# Patient Record
Sex: FEMALE | Race: WHITE | NOT HISPANIC OR LATINO | URBAN - METROPOLITAN AREA
[De-identification: names, ages, dates, MRNs, and addresses within clinical notes are randomized per-mention and may not be internally consistent; named-entity substitution may affect disease eponyms.]

---

## 2017-07-06 ENCOUNTER — OUTPATIENT (OUTPATIENT)
Dept: OUTPATIENT SERVICES | Facility: HOSPITAL | Age: 57
LOS: 1 days | End: 2017-07-06
Payer: OTHER MISCELLANEOUS

## 2017-07-06 VITALS
SYSTOLIC BLOOD PRESSURE: 122 MMHG | DIASTOLIC BLOOD PRESSURE: 78 MMHG | HEART RATE: 80 BPM | RESPIRATION RATE: 16 BRPM | TEMPERATURE: 99 F | WEIGHT: 184.09 LBS | HEIGHT: 68 IN

## 2017-07-06 DIAGNOSIS — M75.42 IMPINGEMENT SYNDROME OF LEFT SHOULDER: ICD-10-CM

## 2017-07-06 DIAGNOSIS — Z98.51 TUBAL LIGATION STATUS: Chronic | ICD-10-CM

## 2017-07-06 DIAGNOSIS — Z98.89 OTHER SPECIFIED POSTPROCEDURAL STATES: Chronic | ICD-10-CM

## 2017-07-06 DIAGNOSIS — Z98.890 OTHER SPECIFIED POSTPROCEDURAL STATES: Chronic | ICD-10-CM

## 2017-07-06 DIAGNOSIS — M25.512 PAIN IN LEFT SHOULDER: ICD-10-CM

## 2017-07-06 LAB
BASOPHILS # BLD AUTO: 0.03 K/UL — SIGNIFICANT CHANGE UP (ref 0–0.2)
BASOPHILS NFR BLD AUTO: 0.5 % — SIGNIFICANT CHANGE UP (ref 0–2)
BUN SERPL-MCNC: 14 MG/DL — SIGNIFICANT CHANGE UP (ref 7–23)
CALCIUM SERPL-MCNC: 9.7 MG/DL — SIGNIFICANT CHANGE UP (ref 8.4–10.5)
CHLORIDE SERPL-SCNC: 103 MMOL/L — SIGNIFICANT CHANGE UP (ref 98–107)
CO2 SERPL-SCNC: 25 MMOL/L — SIGNIFICANT CHANGE UP (ref 22–31)
CREAT SERPL-MCNC: 0.89 MG/DL — SIGNIFICANT CHANGE UP (ref 0.5–1.3)
EOSINOPHIL # BLD AUTO: 0.07 K/UL — SIGNIFICANT CHANGE UP (ref 0–0.5)
EOSINOPHIL NFR BLD AUTO: 1.2 % — SIGNIFICANT CHANGE UP (ref 0–6)
GLUCOSE SERPL-MCNC: 57 MG/DL — LOW (ref 70–99)
HCT VFR BLD CALC: 45.5 % — HIGH (ref 34.5–45)
HGB BLD-MCNC: 14.8 G/DL — SIGNIFICANT CHANGE UP (ref 11.5–15.5)
IMM GRANULOCYTES # BLD AUTO: 0.01 # — SIGNIFICANT CHANGE UP
IMM GRANULOCYTES NFR BLD AUTO: 0.2 % — SIGNIFICANT CHANGE UP (ref 0–1.5)
LYMPHOCYTES # BLD AUTO: 2.4 K/UL — SIGNIFICANT CHANGE UP (ref 1–3.3)
LYMPHOCYTES # BLD AUTO: 41 % — SIGNIFICANT CHANGE UP (ref 13–44)
MCHC RBC-ENTMCNC: 32.2 PG — SIGNIFICANT CHANGE UP (ref 27–34)
MCHC RBC-ENTMCNC: 32.5 % — SIGNIFICANT CHANGE UP (ref 32–36)
MCV RBC AUTO: 99.1 FL — SIGNIFICANT CHANGE UP (ref 80–100)
MONOCYTES # BLD AUTO: 0.44 K/UL — SIGNIFICANT CHANGE UP (ref 0–0.9)
MONOCYTES NFR BLD AUTO: 7.5 % — SIGNIFICANT CHANGE UP (ref 2–14)
NEUTROPHILS # BLD AUTO: 2.9 K/UL — SIGNIFICANT CHANGE UP (ref 1.8–7.4)
NEUTROPHILS NFR BLD AUTO: 49.6 % — SIGNIFICANT CHANGE UP (ref 43–77)
NRBC # FLD: 0 — SIGNIFICANT CHANGE UP
PLATELET # BLD AUTO: 273 K/UL — SIGNIFICANT CHANGE UP (ref 150–400)
PMV BLD: 10.9 FL — SIGNIFICANT CHANGE UP (ref 7–13)
POTASSIUM SERPL-MCNC: 3.9 MMOL/L — SIGNIFICANT CHANGE UP (ref 3.5–5.3)
POTASSIUM SERPL-SCNC: 3.9 MMOL/L — SIGNIFICANT CHANGE UP (ref 3.5–5.3)
RBC # BLD: 4.59 M/UL — SIGNIFICANT CHANGE UP (ref 3.8–5.2)
RBC # FLD: 13.2 % — SIGNIFICANT CHANGE UP (ref 10.3–14.5)
SODIUM SERPL-SCNC: 144 MMOL/L — SIGNIFICANT CHANGE UP (ref 135–145)
WBC # BLD: 5.85 K/UL — SIGNIFICANT CHANGE UP (ref 3.8–10.5)
WBC # FLD AUTO: 5.85 K/UL — SIGNIFICANT CHANGE UP (ref 3.8–10.5)

## 2017-07-06 PROCEDURE — 93010 ELECTROCARDIOGRAM REPORT: CPT

## 2017-07-06 NOTE — H&P PST ADULT - FAMILY HISTORY
Father  Still living? No  Family history of CHF (congestive heart failure), Age at diagnosis: Age Unknown     Sibling  Still living? Yes, Estimated age: 74  Hypertension, Age at diagnosis: Age Unknown     Sibling  Still living? Yes, Estimated age: 69  Hypertension, Age at diagnosis: Age Unknown     Sibling  Still living? Yes, Estimated age: 64  Hypertension, Age at diagnosis: Age Unknown     Sibling  Still living? Yes, Estimated age: 62  Hypertension, Age at diagnosis: Age Unknown     Sibling  Still living? Yes, Estimated age: 59  Hypertension, Age at diagnosis: Age Unknown     Sibling  Still living? No  Family history of heart attack, Age at diagnosis: Age Unknown

## 2017-07-06 NOTE — H&P PST ADULT - REASON FOR ADMISSION
"he's going to shave 2 spots of the bone here, my left shoulder and I think he's going to release a tendon in the back to help my arm move more freely"

## 2017-07-06 NOTE — H&P PST ADULT - NSANTHOSAYNRD_GEN_A_CORE
No. ESTELLE screening performed.  STOP BANG Legend: 0-2 = LOW Risk; 3-4 = INTERMEDIATE Risk; 5-8 = HIGH Risk

## 2017-07-06 NOTE — H&P PST ADULT - PROBLEM SELECTOR PLAN 1
Pt. is scheduled for a left shoulder arthroscopy posterior capsular release, subacromial decompression distal clavicle resection 7/24/17.

## 2017-07-06 NOTE — H&P PST ADULT - PMH
Breast cyst, right    Rotator cuff (capsule) sprain  Right shoulder Breast cyst, right    Estrogen deficiency

## 2017-07-06 NOTE — H&P PST ADULT - PSH
H/O arthroscopy of right knee    History of arthroscopy of right knee  Rotator Cuff Repair - 2013  S/P tonsillectomy    Tubal ligation status  1996 H/O arthroscopy of shoulder  right x2-2014, 2015  H/O tubal ligation  1996  History of open reduction and internal fixation (ORIF) procedure  right knee-1980  S/P tonsillectomy

## 2017-07-06 NOTE — H&P PST ADULT - ATTENDING COMMENTS
To the best of my ability as an orthopedic surgeon, I assert this.  As for the H & P, there are no changes orthopaedically.  Otherwise as per anesthesia and/or medicine

## 2017-09-18 ENCOUNTER — OUTPATIENT (OUTPATIENT)
Dept: OUTPATIENT SERVICES | Facility: HOSPITAL | Age: 57
LOS: 1 days | End: 2017-09-18

## 2017-09-18 VITALS
HEART RATE: 67 BPM | SYSTOLIC BLOOD PRESSURE: 114 MMHG | TEMPERATURE: 98 F | WEIGHT: 184.97 LBS | OXYGEN SATURATION: 98 % | HEIGHT: 68 IN | RESPIRATION RATE: 16 BRPM | DIASTOLIC BLOOD PRESSURE: 80 MMHG

## 2017-09-18 DIAGNOSIS — M75.42 IMPINGEMENT SYNDROME OF LEFT SHOULDER: ICD-10-CM

## 2017-09-18 DIAGNOSIS — M75.02 ADHESIVE CAPSULITIS OF LEFT SHOULDER: ICD-10-CM

## 2017-09-18 DIAGNOSIS — Z98.89 OTHER SPECIFIED POSTPROCEDURAL STATES: Chronic | ICD-10-CM

## 2017-09-18 DIAGNOSIS — Z98.890 OTHER SPECIFIED POSTPROCEDURAL STATES: Chronic | ICD-10-CM

## 2017-09-18 DIAGNOSIS — Z98.51 TUBAL LIGATION STATUS: Chronic | ICD-10-CM

## 2017-09-18 LAB
HCT VFR BLD CALC: 43.8 % — SIGNIFICANT CHANGE UP (ref 34.5–45)
HGB BLD-MCNC: 14.6 G/DL — SIGNIFICANT CHANGE UP (ref 11.5–15.5)
MCHC RBC-ENTMCNC: 32.5 PG — SIGNIFICANT CHANGE UP (ref 27–34)
MCHC RBC-ENTMCNC: 33.3 % — SIGNIFICANT CHANGE UP (ref 32–36)
MCV RBC AUTO: 97.6 FL — SIGNIFICANT CHANGE UP (ref 80–100)
NRBC # FLD: 0 — SIGNIFICANT CHANGE UP
PLATELET # BLD AUTO: 266 K/UL — SIGNIFICANT CHANGE UP (ref 150–400)
PMV BLD: 10.7 FL — SIGNIFICANT CHANGE UP (ref 7–13)
RBC # BLD: 4.49 M/UL — SIGNIFICANT CHANGE UP (ref 3.8–5.2)
RBC # FLD: 13.5 % — SIGNIFICANT CHANGE UP (ref 10.3–14.5)
WBC # BLD: 5.73 K/UL — SIGNIFICANT CHANGE UP (ref 3.8–10.5)
WBC # FLD AUTO: 5.73 K/UL — SIGNIFICANT CHANGE UP (ref 3.8–10.5)

## 2017-09-18 NOTE — H&P PST ADULT - HISTORY OF PRESENT ILLNESS
57 yr old female with no significant medical hx presents for preop evaluation with c/o left shoulder pain x 1 yr after lifting heavy box.  Pt s/p MRI and was dx with Adhesive capsulitis of left shoulder and is now scheduled for Left Shoulder Arthroscopy 57 yr old female with no significant medical hx presents for preop evaluation with c/o left shoulder pain x 1 yr after lifting heavy box.  Pt s/p MRI and was dx with Adhesive capsulitis of left shoulder and is now scheduled for Left Shoulder Arthroscopy, Posterior Capsular Release, Subacromial Decompression Distal Clavicle Resection on 09/25/17.

## 2017-09-18 NOTE — H&P PST ADULT - PSH
H/O arthroscopy of shoulder  right x2-2014, 2015  H/O tubal ligation  1996  History of open reduction and internal fixation (ORIF) procedure  right knee-1980  S/P tonsillectomy

## 2017-09-18 NOTE — H&P PST ADULT - PROBLEM SELECTOR PLAN 1
Scheduled for Left Shoulder Arthroscopy, Posterior Capsular Release, Subacromial Decompression Distal Resection on 09/25/17.  Lab result pending.  Preop, famotidine and chlorhexidine instructions provided and questions addressed.

## 2017-09-18 NOTE — H&P PST ADULT - RS GEN PE MLT RESP DETAILS PC
clear to auscultation bilaterally/respirations non-labored/no rales/breath sounds equal/no rhonchi/no wheezes

## 2017-09-18 NOTE — H&P PST ADULT - GASTROINTESTINAL DETAILS
soft/nontender/no distention/no rebound tenderness/no rigidity/no guarding/no organomegaly/no bruit/no masses palpable/bowel sounds normal

## 2017-09-18 NOTE — H&P PST ADULT - LYMPHATIC
supraclavicular L/posterior cervical L/anterior cervical L/supraclavicular R/posterior cervical R/anterior cervical R

## 2017-09-18 NOTE — H&P PST ADULT - ATTENDING COMMENTS
family/patient To the best of my ability as an orthopedic surgeon, I assert this.  As for the H & P, there are no changes orthopaedically.  Otherwise as per anesthesia and/or medicine.. To the best of my ability as an orthopedic surgeon, I assert this.  As for the H & P, there are no changes orthopaedically.  Otherwise as per anesthesia and/or medicine.

## 2017-09-25 ENCOUNTER — OUTPATIENT (OUTPATIENT)
Dept: OUTPATIENT SERVICES | Facility: HOSPITAL | Age: 57
LOS: 1 days | Discharge: HOME | End: 2017-09-25

## 2017-09-25 VITALS
OXYGEN SATURATION: 98 % | SYSTOLIC BLOOD PRESSURE: 95 MMHG | DIASTOLIC BLOOD PRESSURE: 70 MMHG | HEART RATE: 67 BPM | TEMPERATURE: 98 F | RESPIRATION RATE: 14 BRPM

## 2017-09-25 VITALS
OXYGEN SATURATION: 97 % | SYSTOLIC BLOOD PRESSURE: 104 MMHG | RESPIRATION RATE: 16 BRPM | DIASTOLIC BLOOD PRESSURE: 68 MMHG | HEIGHT: 68 IN | WEIGHT: 184.97 LBS | HEART RATE: 61 BPM | TEMPERATURE: 98 F

## 2017-09-25 DIAGNOSIS — Z98.890 OTHER SPECIFIED POSTPROCEDURAL STATES: Chronic | ICD-10-CM

## 2017-09-25 DIAGNOSIS — M75.42 IMPINGEMENT SYNDROME OF LEFT SHOULDER: ICD-10-CM

## 2017-09-25 DIAGNOSIS — Z98.89 OTHER SPECIFIED POSTPROCEDURAL STATES: Chronic | ICD-10-CM

## 2017-09-25 DIAGNOSIS — Z98.51 TUBAL LIGATION STATUS: Chronic | ICD-10-CM

## 2017-09-25 NOTE — BRIEF OPERATIVE NOTE - PROCEDURE
<<-----Click on this checkbox to enter Procedure Shoulder arthroscopy with Barbara procedure  09/25/2017  posterior capsular release  Active  ALMAL

## 2017-09-25 NOTE — ASU DISCHARGE PLAN (ADULT/PEDIATRIC). - SPECIAL INSTRUCTIONS
Start motion exercises in 1 day with pendulum motions. See instruction sheet for further instruction. Start motion exercises in 1 day with pendulum motions. See instruction sheet for further instruction.    Use incentive spirometer as instructed.   Start physical therapy in 1-2 days.

## 2017-09-25 NOTE — ASU DISCHARGE PLAN (ADULT/PEDIATRIC). - NURSING INSTRUCTIONS
Narcotic pain medication may cause nausea or constipation. Take medication with food. Increase fluids and fiber intake.   Use Cryocuff as directed.  Take pain medications as needed. No Toradol before 4 PM today.

## 2017-09-25 NOTE — ASU DISCHARGE PLAN (ADULT/PEDIATRIC). - ITEMS TO FOLLOWUP WITH YOUR PHYSICIAN'S
If you develop fever, chills, shakes or incision develops discharge call [phone] (24 hours a day) or go to ER.

## 2017-09-25 NOTE — ASU DISCHARGE PLAN (ADULT/PEDIATRIC). - NOTIFY
Persistent Nausea and Vomiting/Pain not relieved by Medications/Fever greater than 101/Swelling that continues/Bleeding that does not stop/Numbness, color, or temperature change to extremity/Unable to Urinate/Inability to Tolerate Liquids or Foods

## 2017-09-25 NOTE — ASU DISCHARGE PLAN (ADULT/PEDIATRIC). - ACTIVITY LEVEL
no sports/gym/Do not lift, push, pull, or carry anything with left arm/hand. Do not lift or actively move elbow away from side of body./no exercise/no heavy lifting

## 2019-07-31 PROBLEM — M75.02 ADHESIVE CAPSULITIS OF LEFT SHOULDER: Chronic | Status: ACTIVE | Noted: 2017-09-18

## 2019-07-31 PROBLEM — E28.39 OTHER PRIMARY OVARIAN FAILURE: Chronic | Status: ACTIVE | Noted: 2017-07-06

## 2019-08-27 ENCOUNTER — OUTPATIENT (OUTPATIENT)
Dept: OUTPATIENT SERVICES | Facility: HOSPITAL | Age: 59
LOS: 1 days | End: 2019-08-27

## 2019-08-27 VITALS
RESPIRATION RATE: 18 BRPM | TEMPERATURE: 98 F | DIASTOLIC BLOOD PRESSURE: 80 MMHG | OXYGEN SATURATION: 98 % | HEIGHT: 68 IN | WEIGHT: 154.1 LBS | HEART RATE: 76 BPM | SYSTOLIC BLOOD PRESSURE: 100 MMHG

## 2019-08-27 DIAGNOSIS — Z98.890 OTHER SPECIFIED POSTPROCEDURAL STATES: Chronic | ICD-10-CM

## 2019-08-27 DIAGNOSIS — Z98.51 TUBAL LIGATION STATUS: Chronic | ICD-10-CM

## 2019-08-27 DIAGNOSIS — Z98.89 OTHER SPECIFIED POSTPROCEDURAL STATES: Chronic | ICD-10-CM

## 2019-08-27 DIAGNOSIS — M25.512 PAIN IN LEFT SHOULDER: ICD-10-CM

## 2019-08-27 LAB
ANION GAP SERPL CALC-SCNC: 13 MMO/L — SIGNIFICANT CHANGE UP (ref 7–14)
BUN SERPL-MCNC: 13 MG/DL — SIGNIFICANT CHANGE UP (ref 7–23)
CALCIUM SERPL-MCNC: 9.6 MG/DL — SIGNIFICANT CHANGE UP (ref 8.4–10.5)
CHLORIDE SERPL-SCNC: 103 MMOL/L — SIGNIFICANT CHANGE UP (ref 98–107)
CO2 SERPL-SCNC: 24 MMOL/L — SIGNIFICANT CHANGE UP (ref 22–31)
CREAT SERPL-MCNC: 0.78 MG/DL — SIGNIFICANT CHANGE UP (ref 0.5–1.3)
GLUCOSE SERPL-MCNC: 75 MG/DL — SIGNIFICANT CHANGE UP (ref 70–99)
HCT VFR BLD CALC: 39.5 % — SIGNIFICANT CHANGE UP (ref 34.5–45)
HGB BLD-MCNC: 13.1 G/DL — SIGNIFICANT CHANGE UP (ref 11.5–15.5)
MCHC RBC-ENTMCNC: 32.8 PG — SIGNIFICANT CHANGE UP (ref 27–34)
MCHC RBC-ENTMCNC: 33.2 % — SIGNIFICANT CHANGE UP (ref 32–36)
MCV RBC AUTO: 99 FL — SIGNIFICANT CHANGE UP (ref 80–100)
NRBC # FLD: 0 K/UL — SIGNIFICANT CHANGE UP (ref 0–0)
PLATELET # BLD AUTO: 273 K/UL — SIGNIFICANT CHANGE UP (ref 150–400)
PMV BLD: 11.4 FL — SIGNIFICANT CHANGE UP (ref 7–13)
POTASSIUM SERPL-MCNC: 4 MMOL/L — SIGNIFICANT CHANGE UP (ref 3.5–5.3)
POTASSIUM SERPL-SCNC: 4 MMOL/L — SIGNIFICANT CHANGE UP (ref 3.5–5.3)
RBC # BLD: 3.99 M/UL — SIGNIFICANT CHANGE UP (ref 3.8–5.2)
RBC # FLD: 13.2 % — SIGNIFICANT CHANGE UP (ref 10.3–14.5)
SODIUM SERPL-SCNC: 140 MMOL/L — SIGNIFICANT CHANGE UP (ref 135–145)
WBC # BLD: 6.3 K/UL — SIGNIFICANT CHANGE UP (ref 3.8–10.5)
WBC # FLD AUTO: 6.3 K/UL — SIGNIFICANT CHANGE UP (ref 3.8–10.5)

## 2019-08-27 RX ORDER — NORETHINDRONE AND ETHINYL ESTRADIOL 0.4-0.035
1 KIT ORAL
Qty: 0 | Refills: 0 | DISCHARGE

## 2019-08-27 NOTE — H&P PST ADULT - NSICDXFAMILYHX_GEN_ALL_CORE_FT
FAMILY HISTORY:  FH: diabetes mellitus, mother    Father  Still living? No  Family history of CHF (congestive heart failure), Age at diagnosis: Age Unknown    Sibling  Still living? Yes, Estimated age: 74  Hypertension, Age at diagnosis: Age Unknown  Hypertension, Age at diagnosis: Age Unknown  Hypertension, Age at diagnosis: Age Unknown  Hypertension, Age at diagnosis: Age Unknown  Hypertension, Age at diagnosis: Age Unknown  Family history of heart attack, Age at diagnosis: Age Unknown

## 2019-08-27 NOTE — H&P PST ADULT - NEGATIVE GENERAL GENITOURINARY SYMPTOMS
no incontinence/no flank pain L/no hematuria/no renal colic/no flank pain R/no dysuria/no bladder infections

## 2019-08-27 NOTE — H&P PST ADULT - NSICDXPASTMEDICALHX_GEN_ALL_CORE_FT
PAST MEDICAL HISTORY:  Adhesive capsulitis of left shoulder     Breast cyst, right     Estrogen deficiency

## 2019-08-27 NOTE — H&P PST ADULT - NEGATIVE ENMT SYMPTOMS
no hearing difficulty/no nasal congestion/no post-nasal discharge/no sinus symptoms/no dysphagia/no throat pain

## 2019-08-27 NOTE — H&P PST ADULT - NSICDXPASTSURGICALHX_GEN_ALL_CORE_FT
PAST SURGICAL HISTORY:  H/O arthroscopy left shoulder 09/2017    H/O arthroscopy of shoulder right x2-2014, 2015    H/O tubal ligation 1996    History of open reduction and internal fixation (ORIF) procedure right knee-1980    S/P lumpectomy of breast benign, left breast    S/P tonsillectomy

## 2019-08-27 NOTE — H&P PST ADULT - HISTORY OF PRESENT ILLNESS
59 y.o. female with preop diagnosis of pain in left shoulder, adhesive capsulitis, reports hx of left shoulder surgery in 09/2017, c/o intermittent pain in the left shoulder, aggravated by movement and activity, and restricted ROM in left shoulder, presents to PST for evaluation for Left Shoulder Arthroscopy, Debridement, Open Distal Clavicle Resection on 09/09/19 59 y.o. female with hx of left shoulder surgery in 09/2017, c/o intermittent pain in the left shoulder, reports pain is worse at night, c/o restricted ROM in left shoulder, preop diagnosis of adhesive capsulitis left shoulder, reports hx of left shoulder surgery in 09/2017, presents to PST for evaluation for Left Shoulder Arthroscopy, Debridement, Open Distal Clavicle Resection on 09/09/19

## 2019-08-27 NOTE — H&P PST ADULT - NSICDXPROBLEM_GEN_ALL_CORE_FT
PROBLEM DIAGNOSES  Problem: Pain in left shoulder  Assessment and Plan: pt scheduled for   Preop instructions provided. Pt verbalized understanding.   Pepcid for GI prophylaxis with written and verbal instruction provided    written and verbal instructions with teach back on chlorhexidine shampoo provided,  pt verbalized understanding with returned demonstration   med eval pending as per surgeon request, copy requested PROBLEM DIAGNOSES  Problem: Pain in left shoulder  Assessment and Plan: pt scheduled for Left Shoulder Arthroscopy, Debridement, Open Distal Clavicle Resection on 09/09/19   Preop instructions provided. Pt verbalized understanding.   Pepcid for GI prophylaxis with written and verbal instruction provided    written and verbal instructions with teach back on chlorhexidine shampoo provided,  pt verbalized understanding with returned demonstration   med eval pending as per surgeon request, copy requested

## 2019-08-27 NOTE — H&P PST ADULT - ACTIVITY
walking, aerobics, daily, utilizes stairs in the home "at least a half dozen times a day", denies SOB

## 2019-08-27 NOTE — H&P PST ADULT - MUSCULOSKELETAL
Cesar 44  Brief Op Note     Lavell Pepe Location: OR   [facility] [id number] MRN [id number]   Admission Date 3/13/2017 Operation Date 3/13/2017   Attending Physician Rocio Diego MD Operating Physician Phylicia Barreto MD details… detailed exam left shoulder/normal strength/decreased ROM due to pain/no calf tenderness

## 2019-08-27 NOTE — H&P PST ADULT - NEGATIVE MUSCULOSKELETAL SYMPTOMS
no joint swelling/no muscle weakness/no arthralgia/no arthritis/no stiffness/no myalgia/no back pain/no neck pain

## 2019-09-08 ENCOUNTER — TRANSCRIPTION ENCOUNTER (OUTPATIENT)
Age: 59
End: 2019-09-08

## 2019-09-09 ENCOUNTER — RESULT REVIEW (OUTPATIENT)
Age: 59
End: 2019-09-09

## 2019-09-09 ENCOUNTER — OUTPATIENT (OUTPATIENT)
Dept: OUTPATIENT SERVICES | Facility: HOSPITAL | Age: 59
LOS: 1 days | Discharge: HOME | End: 2019-09-09
Payer: OTHER MISCELLANEOUS

## 2019-09-09 VITALS
OXYGEN SATURATION: 99 % | HEIGHT: 68 IN | RESPIRATION RATE: 16 BRPM | SYSTOLIC BLOOD PRESSURE: 109 MMHG | WEIGHT: 154.1 LBS | DIASTOLIC BLOOD PRESSURE: 57 MMHG | HEART RATE: 60 BPM | TEMPERATURE: 98 F

## 2019-09-09 VITALS
DIASTOLIC BLOOD PRESSURE: 75 MMHG | HEART RATE: 72 BPM | OXYGEN SATURATION: 97 % | RESPIRATION RATE: 18 BRPM | SYSTOLIC BLOOD PRESSURE: 114 MMHG | TEMPERATURE: 98 F

## 2019-09-09 DIAGNOSIS — M25.512 PAIN IN LEFT SHOULDER: ICD-10-CM

## 2019-09-09 DIAGNOSIS — Z98.89 OTHER SPECIFIED POSTPROCEDURAL STATES: Chronic | ICD-10-CM

## 2019-09-09 DIAGNOSIS — Z98.51 TUBAL LIGATION STATUS: Chronic | ICD-10-CM

## 2019-09-09 DIAGNOSIS — Z98.890 OTHER SPECIFIED POSTPROCEDURAL STATES: Chronic | ICD-10-CM

## 2019-09-09 PROCEDURE — 88311 DECALCIFY TISSUE: CPT | Mod: 26

## 2019-09-09 PROCEDURE — 88304 TISSUE EXAM BY PATHOLOGIST: CPT | Mod: 26

## 2019-09-09 NOTE — ASU DISCHARGE PLAN (ADULT/PEDIATRIC) - CALL YOUR DOCTOR IF YOU HAVE ANY OF THE FOLLOWING:
Wound/Surgical Site with redness, or foul smelling discharge or pus/Numbness, tingling, color or temperature change to extremity/Fever greater than (need to indicate Fahrenheit or Celsius)/Pain not relieved by Medications Numbness, tingling, color or temperature change to extremity/Nausea and vomiting that does not stop/Inability to tolerate liquids or foods/Swelling that gets worse/Bleeding that does not stop/Pain not relieved by Medications/Fever greater than (need to indicate Fahrenheit or Celsius)/Wound/Surgical Site with redness, or foul smelling discharge or pus

## 2019-09-09 NOTE — ASU DISCHARGE PLAN (ADULT/PEDIATRIC) - CARE PROVIDER_API CALL
Alfred Guillaume)  Orthopaedic Surgery  [facility] [place] [address]  Phone: [phone]  Fax: [phone]  Follow Up Time:

## 2019-09-09 NOTE — ASU DISCHARGE PLAN (ADULT/PEDIATRIC) - PAIN MANAGEMENT
Prescriptions electronically submitted to pharmacy from doctor's office Prescription given to patient/guardian/Prescriptions electronically submitted to pharmacy from doctor's office

## 2019-09-09 NOTE — BRIEF OPERATIVE NOTE - NSICDXBRIEFPROCEDURE_GEN_ALL_CORE_FT
PROCEDURES:  Excision, clavicle, distal, open 09-Sep-2019 09:25:32 left Yuliya Nieves  Arthroscopy of left shoulder with debridement and surgical removal of distal clavicle 09-Sep-2019 09:25:16  Yuliya Nieves

## 2019-09-09 NOTE — ASU DISCHARGE PLAN (ADULT/PEDIATRIC) - ASU DC SPECIAL INSTRUCTIONSFT
Narcotic pain medicine can cause extreme nausea and constipation. Drink plenty of water and take diuretics (colace, Miralax) as needed. You can get them from your local pharmacy.    It is important to ice your shoulder to keep swelling down and the pain manageable. Keep the ice on for 20 minutes, and then keep off for 20 minutes. Repeat while awake.    Take the pain medication from Dr. Sutton office as prescribed Narcotic pain medicine can cause extreme nausea and constipation. Drink plenty of water and take diuretics (colace, Miralax) as needed. You can get them from your local pharmacy.    It is important to ice your shoulder to keep swelling down and the pain manageable. Keep the ice on for 20 minutes, and then keep off for 20 minutes. Repeat while awake.    Take the pain medication from Dr. Sutton office as prescribed  Refer to Dr. Guillaume instruction     Physical therapy RX given to patient

## 2019-09-18 LAB — SURGICAL PATHOLOGY STUDY: SIGNIFICANT CHANGE UP

## 2020-11-06 NOTE — ASU PREOPERATIVE ASSESSMENT, ADULT (IPARK ONLY) - NS PRO LACT YNNA
Procedure Note  11/6/2020      Patient:  Delmis Bynum    Preoperative Diagnosis:   L carpal tunnel syndrome (CTS)    Postoperative Diagnosis:   L carpal tunnel syndrome (CTS)    Procedure:    L carpal tunnel release (CTR)    Surgeon:  Silvino Nye MD    Assistants: None    Anesthesia Staff: CRNA: (Unknown)  Anesthesiologist: Karen Fontaine MD  Anesthesia Assistant: (Unknown)    Anesthesia Type: Monitor Anesthesia Care    Findings: Moderate to severe flexor tenosynovial hypertrophy. Hyperemia median nerve. No other lesions    Estimated Blood Loss: None    Complications: None    Specimens Removed: None      INDICATIONS:   Patient is an 38 year old female with history of progressive left carpal tunnel syndrome. The patient is brought to the operating room today to undergo left carpal tunnel release. The procedure, postoperative course, potential risks and complications were discussed. All questions answered.     PROCEDURE:   The patient was brought to the operating room (OR), positioned supine on the OR table. IV MAC anesthesia was administered by the anesthesiologist. The arm and hand were then prepped and draped in usual sterile fashion and positioned laterally on an arm board. 0.25% Marcaine was injected into the incision site. Following exsanguination an upper arm tourniquet was inflated to 250 mmHg.     A 2 cm longitudinal incision was made on the proximal palm. Dissection was performed down to the transverse carpal ligament. The transverse carpal ligament and the distal antebrachial fascia were both released sharply in line with the skin incision using the Safeguard sheath to protect the median nerve at all times. Median nerve was hyperemic upon release, but otherwise intact. The synovial tissue was markedly edematous, and had an inflamed appearance. Therefore, the incision was extended, and a complete tenosynovectomy of the flexor compartment was performed. No other lesions were identified.  The flexor tendons themselves, looked unremarkable.     The incision was irrigated. Skin was closed with 4-0 nylon. Marcaine 0.25% was injected around the incision sites.     A sterile dressing was applied.  A cock-up wrist splint was applied. The patient was taken back to the postanesthesia care unit (PACU) in good condition having tolerated the procedure well. Sponge and instrument counts were correct. No complications.     Silvino Nye MD        no

## 2020-11-26 ENCOUNTER — EMERGENCY (EMERGENCY)
Facility: HOSPITAL | Age: 60
LOS: 0 days | Discharge: HOME | End: 2020-11-26
Attending: EMERGENCY MEDICINE
Payer: COMMERCIAL

## 2020-11-26 VITALS
DIASTOLIC BLOOD PRESSURE: 88 MMHG | RESPIRATION RATE: 18 BRPM | HEART RATE: 80 BPM | SYSTOLIC BLOOD PRESSURE: 142 MMHG | TEMPERATURE: 98 F | OXYGEN SATURATION: 100 %

## 2020-11-26 VITALS — HEIGHT: 68 IN | WEIGHT: 130.07 LBS

## 2020-11-26 DIAGNOSIS — Z98.890 OTHER SPECIFIED POSTPROCEDURAL STATES: Chronic | ICD-10-CM

## 2020-11-26 DIAGNOSIS — S61.217A LACERATION WITHOUT FOREIGN BODY OF LEFT LITTLE FINGER WITHOUT DAMAGE TO NAIL, INITIAL ENCOUNTER: ICD-10-CM

## 2020-11-26 DIAGNOSIS — Z98.89 OTHER SPECIFIED POSTPROCEDURAL STATES: Chronic | ICD-10-CM
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
## 2020-11-26 DIAGNOSIS — Z98.51 TUBAL LIGATION STATUS: ICD-10-CM

## 2020-11-26 DIAGNOSIS — Z98.51 TUBAL LIGATION STATUS: Chronic | ICD-10-CM

## 2020-11-26 PROCEDURE — 99282 EMERGENCY DEPT VISIT SF MDM: CPT | Mod: 25

## 2020-11-26 PROCEDURE — 12001 RPR S/N/AX/GEN/TRNK 2.5CM/<: CPT

## 2020-11-26 PROCEDURE — 99283 EMERGENCY DEPT VISIT LOW MDM: CPT

## 2020-11-26 NOTE — ED STATDOCS - PSH
H/O arthroscopy  left shoulder 09/2017  H/O arthroscopy of shoulder  right x2-2014, 2015  H/O tubal ligation  1996  History of open reduction and internal fixation (ORIF) procedure  right knee-1980  S/P lumpectomy of breast  benign, left breast  S/P tonsillectomy

## 2020-11-26 NOTE — ED STATDOCS - SKIN, MLM
skin normal color for race, warm, dry and +1-1.5 cm laceration to volar aspect of left pinky, distal phalanx that is still bleeding moderately

## 2020-11-26 NOTE — ED STATDOCS - NSFOLLOWUPINSTRUCTIONS_ED_ALL_ED_FT
LEAVE DRESSING ON X 48 HOURS. RINSE CLEAN WITH SOAPY WATER AFTER WARDS. COVER WITH BACITRACIN AND BANDAID. HAVE SUTURES REMOVED IN 7-10 DAYS. RETURN TO ED IN EVENT OF WORSENING SYMPTOMS INCLUDING FEVER, DISCHARGE FROM WOUND, INABILITY TO MAKE FIST.     Laceration    WHAT YOU NEED TO KNOW:    A laceration is an injury to the skin and the soft tissue underneath it. Lacerations happen when you are cut or hit by something. They can happen anywhere on the body.     DISCHARGE INSTRUCTIONS:    Return to the emergency department if:     You have heavy bleeding or bleeding that does not stop after 10 minutes of holding firm, direct pressure over the wound.       Your wound opens up.     Contact your healthcare provider if:     You have a fever or chills.       Your laceration is red, warm, or swollen.      You have red streaks on your skin coming from your wound.      You have white or yellow drainage from the wound that smells bad.      You have pain that gets worse, even after treatment.       You have questions or concerns about your condition or care.     Medicines:     Prescription pain medicine may be given. Ask how to take this medicine safely.       Antibiotics help treat or prevent a bacterial infection.       Take your medicine as directed. Contact your healthcare provider if you think your medicine is not helping or if you have side effects. Tell him or her if you are allergic to any medicine. Keep a list of the medicines, vitamins, and herbs you take. Include the amounts, and when and why you take them. Bring the list or the pill bottles to follow-up visits. Carry your medicine list with you in case of an emergency.    Care for your wound as directed:     Do not get your wound wet until your healthcare provider says it is okay. Do not soak your wound in water. Do not go swimming until your healthcare provider says it is okay. Carefully wash the wound with soap and water. Gently pat the area dry or allow it to air dry.       Change your bandages when they get wet, dirty, or after washing. Apply new, clean bandages as directed. Do not apply elastic bandages or tape too tight. Do not put powders or lotions over your incision.       Apply antibiotic ointment as directed. Your healthcare provider may give you antibiotic ointment to put over your wound if you have stitches. If you have strips of tape over your incision, let them dry up and fall off on their own. If they do not fall off within 14 days, gently remove them. If you have glue over your wound, do not remove or pick at it. If your glue comes off, do not replace it with glue that you have at home.       Check your wound every day for signs of infection such as swelling, redness, or pus.     Self-care:     Apply ice on your wound for 15 to 20 minutes every hour or as directed. Use an ice pack, or put crushed ice in a plastic bag. Cover it with a towel. Ice helps prevent tissue damage and decreases swelling and pain.      Use a splint as directed. A splint will decrease movement and stress on your wound. It may help it heal faster. A splint may be used for lacerations over joints or areas of your body that bend. Ask your healthcare provider how to apply and remove a splint.       Decrease scarring of your wound by applying ointments as directed. Do not apply ointments until your healthcare provider says it is okay. You may need to wait until your wound is healed. Ask which ointment to buy and how often to use it. After your wound is healed, use sunscreen over the area when you are out in the sun. You should do this for at least 6 months to 1 year after your injury.     Follow up with your healthcare provider as directed: You may need to follow up in 24 to 48 hours to have your wound checked for infection. You will need to return in 3 to 14 days if you have stitches or staples so they can be removed. Care for your wound as directed to prevent infection and help it heal. Write down your questions so you remember to ask them during your visits.

## 2020-11-26 NOTE — ED ADULT NURSE NOTE - OBJECTIVE STATEMENT
Pt presents to the ED s/p laceration to L pinky earlier today while cutting yams. Pt c/o pain with bleeding controlled at this time. No other complaints at this time.

## 2020-11-26 NOTE — ED STATDOCS - OBJECTIVE STATEMENT
60 year old female no PMHx presents to ED for laceration to left pinky this evening. Pt reports slicing her finger with a kitchen knife while cutting yams PTA. Presents with laceration to left pinky. No other acute complaints at this time. Last tetanus about 4 years ago. Does not take anti-coagulants nor ASA. NKDA.

## 2020-11-26 NOTE — ED STATDOCS - CARE PLAN
Principal Discharge DX:	Laceration of left little finger without foreign body without damage to nail, initial encounter

## 2020-11-26 NOTE — ED STATDOCS - PROGRESS NOTE DETAILS
59 y/o F with no PMH presents with laceration to pinky finger which occurred today. States she was cutting yams and accidentally injured her finger. tetanus up to date. PE: Well appearing. MSK: +1cm laceration to distal pad surface of 5th digit left hand with mild active bleeding. Sensation intact to light touch in upper extremities. Cap refill < 2 sec in UE digits. Full ROM in UE digits. A/P: Laceration. Plan for repair and dc home. - Magen Sol PA-C

## 2020-11-26 NOTE — ED STATDOCS - PATIENT PORTAL LINK FT
You can access the FollowMyHealth Patient Portal offered by [facility] by registering at the following website: [email or web address]. By joining [employer]’s FollowMyHealth portal, you will also be able to view your health information using other applications (apps) compatible with our system.

## 2020-11-26 NOTE — ED STATDOCS - ATTENDING CONTRIBUTION TO CARE
I, Kunal Carter, performed the initial face to face bedside interview with this patient regarding history of present illness, review of symptoms and relevant past medical, social and family history.  I completed an independent physical examination.  I was the initial provider who evaluated this patient. I have signed out the follow up of any pending tests (i.e. labs, radiological studies) to the ACP.  I have communicated the patient’s plan of care and disposition with the ACP.  The history, relevant review of systems, past medical and surgical history, medical decision making, and physical examination was documented by the scribe in my presence and I attest to the accuracy of the documentation.

## 2021-01-19 NOTE — H&P PST ADULT - WEIGHT IN KG
LM that a message will be sent to [facility] for recs. Pt saw CAP today for postpartum appt  and chart notes not entered yet. Pt calling to schedule an IUD insertion. Okay for IUD and which one. Is pt to call with day one of period to have IUD inserted? 69.9

## 2021-03-05 ENCOUNTER — EMERGENCY (EMERGENCY)
Facility: HOSPITAL | Age: 61
LOS: 0 days | Discharge: HOME | End: 2021-03-05
Attending: EMERGENCY MEDICINE
Payer: COMMERCIAL

## 2021-03-05 VITALS
WEIGHT: 154.98 LBS | HEIGHT: 68 IN | SYSTOLIC BLOOD PRESSURE: 125 MMHG | DIASTOLIC BLOOD PRESSURE: 80 MMHG | OXYGEN SATURATION: 100 % | TEMPERATURE: 99 F | RESPIRATION RATE: 15 BRPM | HEART RATE: 82 BPM

## 2021-03-05 VITALS
SYSTOLIC BLOOD PRESSURE: 118 MMHG | DIASTOLIC BLOOD PRESSURE: 75 MMHG | HEART RATE: 65 BPM | RESPIRATION RATE: 16 BRPM | OXYGEN SATURATION: 95 %

## 2021-03-05 DIAGNOSIS — R11.0 NAUSEA: ICD-10-CM

## 2021-03-05 DIAGNOSIS — Z98.890 OTHER SPECIFIED POSTPROCEDURAL STATES: Chronic | ICD-10-CM

## 2021-03-05 DIAGNOSIS — R00.1 BRADYCARDIA, UNSPECIFIED: ICD-10-CM

## 2021-03-05 DIAGNOSIS — R42 DIZZINESS AND GIDDINESS: ICD-10-CM

## 2021-03-05 DIAGNOSIS — E28.39 OTHER PRIMARY OVARIAN FAILURE: ICD-10-CM

## 2021-03-05 DIAGNOSIS — Z98.51 TUBAL LIGATION STATUS: Chronic | ICD-10-CM

## 2021-03-05 DIAGNOSIS — Z98.51 TUBAL LIGATION STATUS: ICD-10-CM

## 2021-03-05 DIAGNOSIS — Z98.89 OTHER SPECIFIED POSTPROCEDURAL STATES: Chronic | ICD-10-CM

## 2021-03-05 LAB
ADD ON TEST-SPECIMEN IN LAB: SIGNIFICANT CHANGE UP
ALBUMIN SERPL ELPH-MCNC: 3.5 G/DL — SIGNIFICANT CHANGE UP (ref 3.3–5)
ALP SERPL-CCNC: 61 U/L — SIGNIFICANT CHANGE UP (ref 40–120)
ALT FLD-CCNC: 21 U/L — SIGNIFICANT CHANGE UP (ref 12–78)
ANION GAP SERPL CALC-SCNC: 6 MMOL/L — SIGNIFICANT CHANGE UP (ref 5–17)
APPEARANCE UR: CLEAR — SIGNIFICANT CHANGE UP
APTT BLD: 30.5 SEC — SIGNIFICANT CHANGE UP (ref 27.5–35.5)
AST SERPL-CCNC: 16 U/L — SIGNIFICANT CHANGE UP (ref 15–37)
BASOPHILS # BLD AUTO: 0.05 K/UL — SIGNIFICANT CHANGE UP (ref 0–0.2)
BASOPHILS NFR BLD AUTO: 0.7 % — SIGNIFICANT CHANGE UP (ref 0–2)
BILIRUB SERPL-MCNC: 0.3 MG/DL — SIGNIFICANT CHANGE UP (ref 0.2–1.2)
BILIRUB UR-MCNC: NEGATIVE — SIGNIFICANT CHANGE UP
BUN SERPL-MCNC: 17 MG/DL — SIGNIFICANT CHANGE UP (ref 7–23)
CALCIUM SERPL-MCNC: 9.1 MG/DL — SIGNIFICANT CHANGE UP (ref 8.5–10.1)
CHLORIDE SERPL-SCNC: 109 MMOL/L — HIGH (ref 96–108)
CO2 SERPL-SCNC: 27 MMOL/L — SIGNIFICANT CHANGE UP (ref 22–31)
COLOR SPEC: YELLOW — SIGNIFICANT CHANGE UP
CREAT SERPL-MCNC: 0.95 MG/DL — SIGNIFICANT CHANGE UP (ref 0.5–1.3)
DIFF PNL FLD: NEGATIVE — SIGNIFICANT CHANGE UP
EOSINOPHIL # BLD AUTO: 0.04 K/UL — SIGNIFICANT CHANGE UP (ref 0–0.5)
EOSINOPHIL NFR BLD AUTO: 0.6 % — SIGNIFICANT CHANGE UP (ref 0–6)
GLUCOSE SERPL-MCNC: 114 MG/DL — HIGH (ref 70–99)
GLUCOSE UR QL: NEGATIVE MG/DL — SIGNIFICANT CHANGE UP
HCT VFR BLD CALC: 41.6 % — SIGNIFICANT CHANGE UP (ref 34.5–45)
HGB BLD-MCNC: 13.9 G/DL — SIGNIFICANT CHANGE UP (ref 11.5–15.5)
IMM GRANULOCYTES NFR BLD AUTO: 0.3 % — SIGNIFICANT CHANGE UP (ref 0–1.5)
INR BLD: 0.95 RATIO — SIGNIFICANT CHANGE UP (ref 0.88–1.16)
KETONES UR-MCNC: NEGATIVE — SIGNIFICANT CHANGE UP
LEUKOCYTE ESTERASE UR-ACNC: ABNORMAL
LYMPHOCYTES # BLD AUTO: 2.8 K/UL — SIGNIFICANT CHANGE UP (ref 1–3.3)
LYMPHOCYTES # BLD AUTO: 39 % — SIGNIFICANT CHANGE UP (ref 13–44)
MCHC RBC-ENTMCNC: 33.1 PG — SIGNIFICANT CHANGE UP (ref 27–34)
MCHC RBC-ENTMCNC: 33.4 GM/DL — SIGNIFICANT CHANGE UP (ref 32–36)
MCV RBC AUTO: 99 FL — SIGNIFICANT CHANGE UP (ref 80–100)
MONOCYTES # BLD AUTO: 0.32 K/UL — SIGNIFICANT CHANGE UP (ref 0–0.9)
MONOCYTES NFR BLD AUTO: 4.5 % — SIGNIFICANT CHANGE UP (ref 2–14)
NEUTROPHILS # BLD AUTO: 3.95 K/UL — SIGNIFICANT CHANGE UP (ref 1.8–7.4)
NEUTROPHILS NFR BLD AUTO: 54.9 % — SIGNIFICANT CHANGE UP (ref 43–77)
NITRITE UR-MCNC: NEGATIVE — SIGNIFICANT CHANGE UP
PH UR: 5 — SIGNIFICANT CHANGE UP (ref 5–8)
PLATELET # BLD AUTO: 270 K/UL — SIGNIFICANT CHANGE UP (ref 150–400)
POTASSIUM SERPL-MCNC: 4.1 MMOL/L — SIGNIFICANT CHANGE UP (ref 3.5–5.3)
POTASSIUM SERPL-SCNC: 4.1 MMOL/L — SIGNIFICANT CHANGE UP (ref 3.5–5.3)
PROT SERPL-MCNC: 7.3 GM/DL — SIGNIFICANT CHANGE UP (ref 6–8.3)
PROT UR-MCNC: NEGATIVE MG/DL — SIGNIFICANT CHANGE UP
PROTHROM AB SERPL-ACNC: 11.1 SEC — SIGNIFICANT CHANGE UP (ref 10.6–13.6)
RBC # BLD: 4.2 M/UL — SIGNIFICANT CHANGE UP (ref 3.8–5.2)
RBC # FLD: 12.5 % — SIGNIFICANT CHANGE UP (ref 10.3–14.5)
SODIUM SERPL-SCNC: 142 MMOL/L — SIGNIFICANT CHANGE UP (ref 135–145)
SP GR SPEC: 1.01 — SIGNIFICANT CHANGE UP (ref 1.01–1.02)
UROBILINOGEN FLD QL: NEGATIVE MG/DL — SIGNIFICANT CHANGE UP
WBC # BLD: 7.18 K/UL — SIGNIFICANT CHANGE UP (ref 3.8–10.5)
WBC # FLD AUTO: 7.18 K/UL — SIGNIFICANT CHANGE UP (ref 3.8–10.5)

## 2021-03-05 PROCEDURE — 99284 EMERGENCY DEPT VISIT MOD MDM: CPT | Mod: 25

## 2021-03-05 PROCEDURE — 80053 COMPREHEN METABOLIC PANEL: CPT

## 2021-03-05 PROCEDURE — 85730 THROMBOPLASTIN TIME PARTIAL: CPT

## 2021-03-05 PROCEDURE — 87086 URINE CULTURE/COLONY COUNT: CPT

## 2021-03-05 PROCEDURE — 84484 ASSAY OF TROPONIN QUANT: CPT

## 2021-03-05 PROCEDURE — 81001 URINALYSIS AUTO W/SCOPE: CPT

## 2021-03-05 PROCEDURE — 71045 X-RAY EXAM CHEST 1 VIEW: CPT | Mod: 26

## 2021-03-05 PROCEDURE — 93010 ELECTROCARDIOGRAM REPORT: CPT

## 2021-03-05 PROCEDURE — 99285 EMERGENCY DEPT VISIT HI MDM: CPT

## 2021-03-05 PROCEDURE — 71045 X-RAY EXAM CHEST 1 VIEW: CPT

## 2021-03-05 PROCEDURE — 85610 PROTHROMBIN TIME: CPT

## 2021-03-05 PROCEDURE — 96360 HYDRATION IV INFUSION INIT: CPT

## 2021-03-05 PROCEDURE — 70450 CT HEAD/BRAIN W/O DYE: CPT

## 2021-03-05 PROCEDURE — 93005 ELECTROCARDIOGRAM TRACING: CPT

## 2021-03-05 PROCEDURE — 85025 COMPLETE CBC W/AUTO DIFF WBC: CPT

## 2021-03-05 PROCEDURE — 36415 COLL VENOUS BLD VENIPUNCTURE: CPT

## 2021-03-05 PROCEDURE — 70450 CT HEAD/BRAIN W/O DYE: CPT | Mod: 26

## 2021-03-05 RX ORDER — MECLIZINE HCL 12.5 MG
1 TABLET ORAL
Qty: 12 | Refills: 0
Start: 2021-03-05

## 2021-03-05 RX ORDER — SODIUM CHLORIDE 9 MG/ML
1000 INJECTION INTRAMUSCULAR; INTRAVENOUS; SUBCUTANEOUS ONCE
Refills: 0 | Status: COMPLETED | OUTPATIENT
Start: 2021-03-05 | End: 2021-03-05

## 2021-03-05 RX ORDER — MECLIZINE HCL 12.5 MG
25 TABLET ORAL ONCE
Refills: 0 | Status: COMPLETED | OUTPATIENT
Start: 2021-03-05 | End: 2021-03-05

## 2021-03-05 RX ADMIN — SODIUM CHLORIDE 1000 MILLILITER(S): 9 INJECTION INTRAMUSCULAR; INTRAVENOUS; SUBCUTANEOUS at 10:22

## 2021-03-05 RX ADMIN — SODIUM CHLORIDE 1000 MILLILITER(S): 9 INJECTION INTRAMUSCULAR; INTRAVENOUS; SUBCUTANEOUS at 09:21

## 2021-03-05 RX ADMIN — Medication 25 MILLIGRAM(S): at 10:03

## 2021-03-05 NOTE — ED PROVIDER NOTE - NS ED ROS FT
Constitutional: No fever or chills  Eyes: No visual changes  HEENT: No throat pain  CV: No chest pain  Resp: No SOB no cough  GI: No abd pain, nausea or vomiting  : No dysuria  MSK: No musculoskeletal pain  Skin: No rash  Neuro: No headache, +dizziness

## 2021-03-05 NOTE — ED ADULT NURSE NOTE - CAS EDN DISCHARGE ASSESSMENT
Results/Data    Referring Physician: Bonnie   Date of Study: Akua 15, 2018     Complete fetal echocardiogram with 2D, Doppler, and color Doppler imaging on a 24 week gestational age fetus due to  diabetes, AMA.       SUMMARY   Structurally normal fetal heart with normal rhythm. As with any fetal echocardiogram, minor valve abnormalities, small ventricular septal defects, coarctation of the aorta, and partial anomalous pulmonary venous return cannot absolutely be excluded.       Normal fetal echocardiogram.       DETAILED REPORT   There was situs solitus with levocardia, and normal intracardiac connections. Cardiac dimensions and systolic function were normal. There were no atrioventricular or semilunar valve abnormalities. There was continuity between the aortic and mitral valves. Right-to-left shunting was detected across the patent foramen ovale and the patent ductus arteriosus. No large VSD was seen. Aortic arch appeared normal. At least one pulmonary vein was seen returning to the left atrium. There was no pericardial effusion.         The heart rate was appropriate for gestational age at 142 bpm, with normal mechanical SD interval of 115 msec . No arrhythmias were detected during the examination. 1:1 AV conduction was documented by simultaneous mitral and aortic valve Doppler.        Cardiovascular profile score was 10/10, with no evidence of heart failure.       CONCLUSION:   A follow up fetal echocardiogram is not required, but we are available to repeat the scan for any further concerns or clinical changes. Thank you for allowing me to participate in the care of this pregnancy. If you have any questions or concerns do not hesitate to contact me at [phone].        Signatures   Electronically signed by : MICHELLE MCGINNIS M.D.; Jarett 15 2018  1:14PM CST     Alert and oriented to person, place and time

## 2021-03-05 NOTE — ED ADULT NURSE NOTE - OBJECTIVE STATEMENT
pt arrives to ED complaining of episode of dizziness at work this morning. pt states symptoms have resolved prior to arrival. denies medical history. denies daily medications. alert and oriented x 4.

## 2021-03-05 NOTE — ED PROVIDER NOTE - PATIENT PORTAL LINK FT
You can access the FollowMyHealth Patient Portal offered by [facility] by registering at the following website: [email or web address]. By joining [facility]’s FollowMyHealth portal, you will also be able to view your health information using other applications (apps) compatible with our system.

## 2021-03-05 NOTE — ED PROVIDER NOTE - CARE PROVIDER_API CALL
Alexsandra Figueroa  NEUROLOGY - GENERAL  [address] [place] [address], [place] [address]  Phone: [phone]  Fax: [phone]  Follow Up Time: 1-3 Days

## 2021-03-05 NOTE — ED ADULT NURSE REASSESSMENT NOTE - NS ED NURSE REASSESS COMMENT FT1
pt received in bed, c/o dizziness this morning. Antivert administered. Cardiac monitoring in progress, safety maintained.

## 2021-03-05 NOTE — ED PROVIDER NOTE - CLINICAL SUMMARY MEDICAL DECISION MAKING FREE TEXT BOX
59 y/o F presents w/ dizziness at work. Initially pt had trouble w/ dysmetria evaluation. Likely peripheral vertigo. PT has no risk factors but given age will CT brain, labs, give Meclizine and reassess closely.

## 2021-03-05 NOTE — ED PROVIDER NOTE - PROGRESS NOTE DETAILS
Kiana Kendrick for attending Dr. Mcclain: initially pt declined meclizine. Pt still felt dizzy and nauseous. Explained this would help w/ sx. Meclizine given and EKG obtained. Will reassess. pt feeling better amfter meclizine, ambulated in no distress, will dc home with return precautions to f/u with pcp or [facility] and neurology. this was discussed with pt and she read back and understood dc instructions

## 2021-03-05 NOTE — ED PROVIDER NOTE - NSFOLLOWUPCLINICS_GEN_ALL_ED_FT
[facility]  Family Medicine  [address] [place] [address]  Phone: [phone]  Fax:   Follow Up Time:

## 2021-03-05 NOTE — ED ADULT NURSE NOTE - NSIMPLEMENTINTERV_GEN_ALL_ED
Implemented All Universal Safety Interventions:  [place] to call system. Call bell, personal items and telephone within reach. Instruct patient to call for assistance. Room bathroom lighting operational. Non-slip footwear when patient is off stretcher. Physically safe environment: no spills, clutter or unnecessary equipment. Stretcher in lowest position, wheels locked, appropriate side rails in place.

## 2021-03-05 NOTE — ED PROVIDER NOTE - OBJECTIVE STATEMENT
61 y/o female w/ PMHx of adhesive capsulitis of left shoulder, estrogen deficiency, R breast cyst presents to ED c/o dizziness. Pt states she was standing at work, felt dizzy and leaned onto a desk. Pt sat down in a chair and still had mild dizziness. No LOC. Denies difficulty speaking, cough, HA, CP, SOB, n/v/d, fevers, chills. No hx of CVA. Pt has been feeling well otherwise. No other complaints.

## 2021-03-05 NOTE — ED PROVIDER NOTE - PHYSICAL EXAMINATION
Constitutional: Middle aged female lying in bed in NAD AAOx3  Eyes: PERRLA EOMI  Head: Normocephalic atraumatic  Mouth: MMM  Cardiac: regular rate   Resp: Lungs CTAB  GI: Abd s/nt/nd, no rebound or guarding.  Neuro: awake, alert, moving all extremities, cranial nerves 2-12 intact, sensation intact, no dysmetria.  Skin: No rashes

## 2021-03-06 LAB
CULTURE RESULTS: SIGNIFICANT CHANGE UP
SPECIMEN SOURCE: SIGNIFICANT CHANGE UP

## 2021-06-07 NOTE — ED PROCEDURE NOTE - DEPTH OF REPAIR FOR WOUND CLOSURE
IP F/U    Date: 6/5/21  Diagnosis: Seizures (H), Cerebrovascular Accident (Cva), Unspecified Mechanism (H)   Is patient active in care coordination? No  Was patient in TCU? No      
Per chart, patient has been contacted by care coordination. Will close encounter.  
skin

## 2021-12-03 NOTE — ASU PREOPERATIVE ASSESSMENT, ADULT (IPARK ONLY) - NOTHING BY MOUTH SINCE
December 3, 2021       Daljit Brink MD  [address]  [address]  [facility] [address]  Via In Basket      Patient: Hever Altamirano   YOB: 1956   Date of Visit: 12/3/2021       Dear Dr. Brink:    I saw your patient, Hever Altamirano, for an evaluation. Below are my notes for this visit with him.    If you have questions, please do not hesitate to call me.      Sincerely,        Ly Alvarez DO        CC: No Recipients  Ly Alvarez DO  12/3/2021  1:04 PM  Signed  Hever Altamirano  12/03/21  [id number]    CC: T2DM, CKD V on HD s/p DD renal txp at  12/2020, CAD s/p CABG      Referring Provider: Daljit Brink MD    PCP: Daljit Brink MD    HPI/To Review  65 year old male seen inpatient s/p NSTEMI, cardiac cath showed multivessel disease s/p CABG 3/22/19, s/p DD renal txp 12/2020    Diabetes History:  Diagnosed in his 40s     S/p DDRT 12/2020    Previous DM medications:    Current DM medications:  -basaglar 27 units qhs  -humalog 8-12-12 units tid with meals+ scale 2:50>150, up to 40 units per day    Diet: good appetite     Weight: stable     HbA1c: 6.3% 07/2021, 5.8% 03/2021, 6.8% 11/2020, 7.1% 07/2020, 8.3% 01/2020, 6.4% 10/2019, 6.8% 08/2019, 7.3% 07/2019, 6.9% 03/2019   GFR 81    Prednisone 5mg daily       SMBG continuous   On Dexcom G6 CGM, last 2 weeks downloaded   Average glu 147  TIR 77%  1% hypo  SD 40  GMI 6.8%   Post meal hyperglycemia   Prelunch   Post lunch   Predinner    HS    Overnight       Polyuria yes with diuretic   Polydipsia denies   Blurry vision denies   Paresthesias denies     Hypoglycemia awareness: yes when in the 50-60s    DM microvascular complications:   -Neuropathy: no  -Retinopathy: yes, s/p LASER and eyelea injections, does have retinopathy, every 4-6 months Dr Lee Carter, [address] [place] [address], getting LASER injections, did not need at last visit   -Nephropathy: yes, was on HD during CABG hospitalization 03/2019 and remained on HD until renal txp 12/2020  Had admission 04/2021  for neutropenic fever  Microalbumin/ Creatinine Ratio (mg/g)   Date Value   07/12/2021 83.0 (H)         DM macrovascular complications:  -HTN: yes  -HLD: yes on atorvastatin   -CAD: yes s/p CABG in 03/2019, had minor MI after renal txp   -CVA/TIA: no  -PAD/PVD: no    Hospitalizations related to DM: none     History of thyroid disease: on amiodarone for afib/aflutter.   No longer on amiodarone    TSH (mcUnits/mL)   Date Value   03/22/2021 2.269       PMH/PSH  CAD s/p CABG   T2DM  HTN  CKD V   Anemia  Vitamin D deficiency  Renal txp     FH  MOTHER: Congestive heart failure  FATHER: Congestive heart failure  BROTHER: Diabetes mellitus type 1    Social Hx  Negative for etoh  Negative for illicits  Negative for tobacco     Works in security    ALLERGIES:   Allergen Reactions   • Hydralazine THROAT SWELLING         ROS  A 12 Point ROS was negative except that listed in the HPI    Meds  Current Outpatient Medications   Medication Sig   • tamsulosin (FLOMAX) 0.4 MG Cap Take 0.4 mg by mouth.   • tobramycin (TOBREX) 0.3 % ophthalmic solution    • timolol (TIMOPTIC) 0.5 % ophthalmic solution INSTILL 1 DROP IN BOTH EYES EVERY DAY   • NIFEdipine CC (ADALAT CC) 90 MG 24 hr tablet TAKE 1 TABLET BY MOUTH TWICE DAILY TO LOWER BLOOD PRESSURE   • amoxicillin (AMOXIL) 500 MG capsule TAKE ONE CAPSULE BY MOUTH EVERY 8 HOURS FOR 7 DAYS   • Continuous Blood Gluc Sensor (Dexcom G6 Sensor) Misc CHANGE EVERY 10 DAYS   • Continuous Blood Gluc Transmit (Dexcom G6 Transmitter) Misc USE AS DIRECTED   • NIFEdipine XL (PROCARDIA XL) 90 MG 24 hr tablet    • Prevymis 480 MG tablet    • cloNIDine (CATAPRES) 0.2 MG tablet    • Insulin Lispro, 1 Unit Dial, (HumaLOG KwikPen) 100 UNIT/ML pen-injector Prime 2 units before each dose. Inject 10 units under the skin with meals plus scale up to 50 units per day.   • tamsulosin (FLOMAX) 0.4 MG Cap Take 0.4 mg by mouth.   • sulfamethoxazole-trimethoprim (BACTRIM DS) 800-160 MG per tablet Take 1 tablet by  mouth.   • TACROlimus (PROGRAF) 1 MG capsule Take 1 mg by mouth.   • TACROlimus (PROGRAF) 0.5 MG capsule Take 0.5 mg by mouth.   • rosuvastatin (CRESTOR) 10 MG tablet Take 10 mg by mouth.   • predniSONE (DELTASONE) 5 MG tablet 7.5mg daily   • mycophenolate (MYFORTIC) 360 MG DR tablet Take 720 mg by mouth.   • minoxidil (LONITEN) 2.5 MG tablet Take 2.5 mg by mouth.   • isosorbide mononitrate (IMDUR) 60 MG 24 hr tablet Take 60 mg by mouth.   • Calcium Carb-Cholecalciferol 600-500 MG-UNIT Cap Take 2 capsules by mouth.   • Aflibercept 2 MG/0.05ML Solution Prefilled Syringe    • acetaminophen (TYLENOL) 325 MG tablet Take 650 mg by mouth.   • NovoLOG FlexPen 100 UNIT/ML pen-injector 10 units with meals plus scale up to 50 units per day   • insulin glargine (Lantus SoloStar) 100 UNIT/ML pen-injector Inject 27 Units into the skin nightly. Prime 2 units before each dose.   • Multiple Vitamins-Minerals (vitamin - therapeutic multivitamins w/minerals) tablet one time daily.   • B Complex Vitamins (Vitamin-B Complex) Tab 1 tablet.   • Continuous Blood Gluc  (Dexcom G6 ) Device 1 each continuous.   • ONETOUCH VERIO test strip Test blood sugar 4 times daily as directed. Diagnosis: DM2  E11.3599, Z79.4 Meter: one touch verio   • ASPIRIN LOW DOSE 81 MG EC tablet    • Lancets 30G Misc Test glucose QID. Diagnosis T2DM. Meter one touch verio   • BD PEN NEEDLE YUDELKA U/F 32G X 4 MM Misc Use to inject insulin 4 times daily. Remove needle cover(s) to expose needle before injecting.   • ketorolac (ACULAR) 0.5 % ophthalmic solution INT 1 GTT IN OU QID   • timolol (ISTALOL) 0.5 % (DAILY) ophthalmic solution    • Bisacodyl (DULCOLAX PO)    • Multiple Vitamin (MULTI-VITAMIN DAILY PO) daily.   • PANTOPRAZOLE SODIUM PO 40 mg daily.   • torsemide (DEMADEX) 20 MG tablet 20 mg daily.   • Cholecalciferol (VITAMIN D3) 36870 units Tab 50,000 Units.   • torsemide (DEMADEX) 20 MG tablet    • amLODIPine (NORVASC) 10 MG tablet Take 10 mg by  mouth daily.   • atorvastatin (LIPITOR) 80 MG tablet Take 80 mg by mouth daily.   • aspirin 81 MG tablet Take 81 mg by mouth daily.   • carvedilol (COREG) 6.25 MG tablet Take 6.25 mg by mouth 2 times daily (with meals).   • cloNIDine (CATAPRES) 0.3 MG tablet Take 0.3 mg by mouth 2 times daily.   • pantoprazole (PROTONIX) 40 MG tablet Take 40 mg by mouth daily.   • docusate sodium (COLACE) 100 MG capsule Take 100 mg by mouth daily.   • Vitamin D, Ergocalciferol, 82633 units capsule Take 50,000 Units by mouth 1 day a week.     No current facility-administered medications for this visit.       Exam  Visit Vitals  /80 (BP Location: LUE - Left upper extremity, Patient Position: Sitting)   Pulse (!) 52   Ht 5' 7\" (1.702 m)   Wt 93.3 kg (205 lb 12.8 oz)   SpO2 97%   BMI 32.23 kg/m²     Vitals Reviewed  General NAD, NCAT  HEENT EOMI no thyromegaly,  no exophthalmos   CV RRR no edema   Resp nonlabored respirations   Abd nondistended RLQ scar well healed   Ext no edema   MSK normal ROM   Normal gait   Skin no rashes, no ecchymoses  Neuro  alert and oriented x 3   Psych cooperative noncombative  Feet: fungal nail changes in feet bilaterally, normal monofilament exam bilaterally     Labs/Diagnostics   [facility]  The Diabetes Tests flowsheet was reviewed.  Sodium (mmol/L)   Date Value   11/11/2021 143     Potassium (mmol/L)   Date Value   11/11/2021 3.3 (L)     Chloride (mmol/L)   Date Value   11/11/2021 106     Glucose (mg/dL)   Date Value   11/11/2021 110 (H)     Calcium (mg/dL)   Date Value   11/11/2021 8.9     Carbon Dioxide (mmol/L)   Date Value   11/11/2021 30     BUN (mg/dL)   Date Value   11/11/2021 22 (H)     Creatinine (mg/dL)   Date Value   11/11/2021 1.38 (H)     Cholesterol (mg/dL)   Date Value   06/07/2021 142     HDL (mg/dL)   Date Value   06/07/2021 71     Cholesterol/ HDL Ratio (no units)   Date Value   06/07/2021 2.0     Triglycerides (mg/dL)   Date Value   06/07/2021 117     LDL  (mg/dL)   Date Value   06/07/2021 48     Hemoglobin A1C (%)   Date Value   07/12/2021 6.3 (H)     Microalbumin/ Creatinine Ratio (mg/g)   Date Value   07/12/2021 83.0 (H)     WBC (K/mcL)   Date Value   11/11/2021 5.4     RBC (mil/mcL)   Date Value   11/11/2021 5.02     HCT (%)   Date Value   11/11/2021 42.9     HGB (g/dL)   Date Value   11/11/2021 14.6     PLT (K/mcL)   Date Value   11/11/2021 236       Assessment/Plan  T2DM with hyperglycemia   -c/b retinopathy, nephropathy  Hemoglobin A1C (%)   Date Value   07/12/2021 6.3 (H)     -CABG 3/22/19 with new onset ESRD on HD during 03/2019 hospitalization  -s/p DD renal txp 12/2020 with hep C, s/p 8 weeks of treatment, on prednisone 5mg daily  -on dexcom G6 CGM, downloaded, interpreted and reviewed with patient in office, trends noted and recommendations made, see below  -having postprandial hyperglycemia due to prednisone 5mg daily  -basaglar 27-->28 units qhs  -humalog 8-13-12 units with meals + MDSS with prednisone 5 mg daily  -reviewed appropriate med administration technique  -to do SMBG premeals and HS, goal BS   -to call if BS is <70 or >300  -bring log and meter to each visit   -discussed diet modifications   -discussed treatment for hypoglycemia with glucose tabs and/or 4oz of juice and wait 15 min to recheck BS and to repeat until BS>80  -counseled patient on long term disease complications including but not limited to blindness, neuropathy, PVD/PAD, CAD and heart disease, MI/Death, renal insufficiency and/or renal failure  -relevant labs reviewed in [employer]    DM HM:   -ophtho: up to date  -renal: up to date  -HTN: at goal, defer management of HTN To renal txp team  -HLD: continue on HD statin  -feet: recommend follow up with podiatry    CAD  -s/p CABG in 03/2019, developed CALVIN and progression to CKD V    CKD progressive to ESRD on HD, now s/p DD renal txp 12/2020 at [facility]   -in 3/22/19 hospitalization  -had AV fistula placed on L UE in  07/2019       Amiodarone use  -completed earlier in 2019   TSH (mcUnits/mL)   Date Value   03/22/2021 2.269   -with bradycardia, check TSH today     RTC in 4 months, labs today     Thank you, Dr. Daljit Brink MD, for allowing me to participate in the care of this patient!                     24-Sep-2017 19:00

## 2022-01-25 NOTE — H&P PST ADULT - RS GEN PE MLT RESP DETAILS PC
Severe aortic stenosis with successful 29 mm evolut Pro TAVR. No paravalvular leak, mean gradient 1 peak velocity 1 post TAVR.   respirations non-labored/good air movement/clear to auscultation bilaterally/airway patent/breath sounds equal

## 2022-04-15 ENCOUNTER — APPOINTMENT (OUTPATIENT)
Dept: ORTHOPEDIC SURGERY | Facility: CLINIC | Age: 62
End: 2022-04-15

## 2022-04-19 ENCOUNTER — APPOINTMENT (OUTPATIENT)
Dept: PAIN MANAGEMENT | Facility: CLINIC | Age: 62
End: 2022-04-19

## 2022-04-26 ENCOUNTER — APPOINTMENT (OUTPATIENT)
Dept: ORTHOPEDIC SURGERY | Facility: CLINIC | Age: 62
End: 2022-04-26
Payer: COMMERCIAL

## 2022-04-26 VITALS — HEIGHT: 68 IN | WEIGHT: 159 LBS | BODY MASS INDEX: 24.1 KG/M2

## 2022-04-26 DIAGNOSIS — F17.210 NICOTINE DEPENDENCE, CIGARETTES, UNCOMPLICATED: ICD-10-CM

## 2022-04-26 DIAGNOSIS — M16.11 UNILATERAL PRIMARY OSTEOARTHRITIS, RIGHT HIP: ICD-10-CM

## 2022-04-26 DIAGNOSIS — Z78.9 OTHER SPECIFIED HEALTH STATUS: ICD-10-CM

## 2022-04-26 DIAGNOSIS — S73.191A OTHER SPRAIN OF RIGHT HIP, INITIAL ENCOUNTER: ICD-10-CM

## 2022-04-26 DIAGNOSIS — Z78.0 ASYMPTOMATIC MENOPAUSAL STATE: ICD-10-CM

## 2022-04-26 DIAGNOSIS — G62.9 POLYNEUROPATHY, UNSPECIFIED: ICD-10-CM

## 2022-04-26 PROCEDURE — 99072 ADDL SUPL MATRL&STAF TM PHE: CPT

## 2022-04-26 PROCEDURE — 99213 OFFICE O/P EST LOW 20 MIN: CPT

## 2022-04-26 NOTE — PHYSICAL EXAM
[Normal Mood and Affect] : normal mood and affect [Orientated] : orientated [Able to Communicate] : able to communicate [Well Developed] : well developed [Well Nourished] : well nourished [Right] : right hip [NL (35)] : adduction 35 degrees [NL (45)] : internal rotation 45 degrees [5___] : adduction 5[unfilled]/5 [] : non-antalgic [TWNoteComboBox7] : flexion 100 degrees

## 2022-04-26 NOTE — HISTORY OF PRESENT ILLNESS
[9] : 9 [7] : 7 [Dull/Aching] : dull/aching [Tightness] : tightness [Intermittent] : intermittent [Rest] : rest [Meds] : meds [Sitting] : sitting [Walking] : walking [de-identified] : 04/26/22:  Returns today for right hip MRI results. Still c/o rt hip pain when walking. Taking motrin 800mg 1-2x/day w/ some relief.\par \par 03/10/22: Returns today for recurring right groin pain x last 2 days duration. NO hx of trauma. Could hardly walk. Limping. Took motrin 800 mg which helped. Pain was a "9" and now"1-2".\par \par 08/17/21: Returns today for cervical spine MRI results. States her pain level is severe with right neck pain with so\par radiating into her right shoulder blade. Took the MDP only one time, which did not help.\par \par 7/20/21 Return visit for this 60 yo female c/o progressing right sided neck pain x last 3-4 months duration. No hx o\par trauma. Constant and daily. Worse turning head and neck. No wake up pain at night. Pain is a 7-8. Takes motrin 60\par 800mg prn with some relief. \par \par Also complaining of dull right groin pain for the last four months since she had gone on a trip. Yesterday, when she\par stood up her leg buckled.\par PMH: No previous right groin pain. [] : no [FreeTextEntry5] : pain right hip/groin past 2-3 months [FreeTextEntry1] : right hip [de-identified] : 3/10/22 [de-identified] : dr ramon

## 2022-04-26 NOTE — DATA REVIEWED
[MRI] : MRI [Right] : of the right [Hip] : hip [Report was reviewed and noted in the chart] : The report was reviewed and noted in the chart

## 2022-05-17 ENCOUNTER — APPOINTMENT (OUTPATIENT)
Dept: PAIN MANAGEMENT | Facility: CLINIC | Age: 62
End: 2022-05-17

## 2022-05-17 NOTE — HISTORY OF PRESENT ILLNESS
[FreeTextEntry1] : 05/17/2022: follow up today after right cervical RFA on 3/722. \par \par 2/2/22: follow up today after repeat right cervical MBB on 1/10/22. She does not think she got relief, however does not have her pain diary. Takes Motrin and Excedrin PRN for the headaches. \par \par 12/1/21: follow up today after right cervical MBB on 11/16/21. Had 70% relief from MBB #1. Will schedule repeat.\par \par 10/27/21- Patient did not have much relief from VAISHNAVI. Now complains of pain in both sides of neck. Also has some headaches. pain over the right cervical facets. \par \par 9/21/21- Patient still having pain down right arm. Would like VAISHNAVI.\par \par 8/27/21: Patient presents for initial evaluation. She complains of neck and right shoulder pain for the last 3 or so months. Pain started without instigation. pain in the right > left axial neck with intermittent radiation down the right arm. +n/t in the hands. gets pain that radiates cranially in an occipital nerve distribution. Using Excedrin and Motrin PRN. She has not yet started PT. \par \par Subjective Weakness:No\par Numbness/Tingling: Yes\par Bladder/Bowel dysfunction:No\par Gait Abnormalities: Yes/No\par Fine motor coordination changes:No\par \par Attempted modalities for current pain complaint:\par See above:\par Medications: Yes\par \par Injections:VAISHNAVI (9/3/21), (10/4/21)\par right cervical mbb (11/16/21, 1/10/22)\par rigth cervical RFA (3/7/22)\par \par Previous Spine Surgery: N/A\par \par Imaging:\par MRI Cervical Spine (8/12/21) - 1. Straightening of lordosis with diffuse loss of disc signal and multilevel loss of disc height.\par 2. C1-C2: Arthrosis. Nonacute post-traumatic deformity to the tip of the odontoid with capsular thickening and\par anterior capsular calcification.\par 3. C2-C3: Bulge.\par 4. C3-C4: Bulge with asymmetric left herniation impressing on the thecal sac. Luschka hypertrophy and facet\par hypertrophy with mild foraminal stenosis.\par 5. C4-C5: Broad bulge, spondylotic ridge, and broad herniation asymmetric to the left impressing on the thecal\par sac with. Luschka hypertrophy and facet hypertrophy with foraminal stenosis right greater than left.\par 6. C5-C6: Broad bulge with asymmetric to right disc osteophyte complex impressing on the thecal sac. Luschka\par hypertrophy with right foraminal stenosis.\par 7. C6-C7: Broad bulge, spondylotic ridge, and asymmetric to right disc osteophyte complex impressing on the\par thecal sac. Luschka hypertrophy with mild foraminal stenosis.\par 8. C7-T1: Broad bulge with central herniation.\par

## 2022-05-30 NOTE — ED ADULT NURSE NOTE - NS ED PATIENT SAFETY CONCERN
Problem: PAIN - ADULT  Goal: Verbalizes/displays adequate comfort level or baseline comfort level  Description: Interventions:  - Encourage patient to monitor pain and request assistance  - Assess pain using appropriate pain scale  - Administer analgesics based on type and severity of pain and evaluate response  - Implement non-pharmacological measures as appropriate and evaluate response  - Consider cultural and social influences on pain and pain management  - Notify physician/advanced practitioner if interventions unsuccessful or patient reports new pain  Outcome: Progressing     Problem: INFECTION - ADULT  Goal: Absence or prevention of progression during hospitalization  Description: INTERVENTIONS:  - Assess and monitor for signs and symptoms of infection  - Monitor lab/diagnostic results  - Monitor all insertion sites, i e  indwelling lines, tubes, and drains  - Monitor endotracheal if appropriate and nasal secretions for changes in amount and color  - [place] appropriate cooling/warming therapies per order  - Administer medications as ordered  - Instruct and encourage patient and family to use good hand hygiene technique  - Identify and instruct in appropriate isolation precautions for identified infection/condition  Outcome: Progressing  Goal: Absence of fever/infection during neutropenic period  Description: INTERVENTIONS:  - Monitor WBC    Outcome: Progressing     Problem: SAFETY ADULT  Goal: Patient will remain free of falls  Description: INTERVENTIONS:  - Educate patient/family on patient safety including physical limitations  - Instruct patient to call for assistance with activity   - Consult OT/PT to assist with strengthening/mobility   - Keep Call bell within reach  - Keep bed low and locked with side rails adjusted as appropriate  - Keep care items and personal belongings within reach  - Initiate and maintain comfort rounds  - Make Fall Risk Sign visible to staff  - Offer Toileting every  Hours, in advance of need  - Initiate/Maintain alarm  - Obtain necessary fall risk management equipment:   - Apply yellow socks and bracelet for high fall risk patients  - Consider moving patient to room near nurses station  Outcome: Progressing  Goal: Maintain or return to baseline ADL function  Description: INTERVENTIONS:  -  Assess patient's ability to carry out ADLs; assess patient's baseline for ADL function and identify physical deficits which impact ability to perform ADLs (bathing, care of mouth/teeth, toileting, grooming, dressing, etc )  - Assess/evaluate cause of self-care deficits   - Assess range of motion  - Assess patient's mobility; develop plan if impaired  - Assess patient's need for assistive devices and provide as appropriate  - Encourage maximum independence but intervene and supervise when necessary  - Involve family in performance of ADLs  - Assess for home care needs following discharge   - Consider OT consult to assist with ADL evaluation and planning for discharge  - Provide patient education as appropriate  Outcome: Progressing  Goal: Maintains/Returns to pre admission functional level  Description: INTERVENTIONS:  - Perform BMAT or MOVE assessment daily    - Set and communicate daily mobility goal to care team and patient/family/caregiver  - Collaborate with rehabilitation services on mobility goals if consulted  - Perform Range of Motion  times a day  - Reposition patient every  hours    - Dangle patient  times a day  - Stand patient  times a day  - Ambulate patient  times a day  - Out of bed to chair  times a day   - Out of bed for meals  times a day  - Out of bed for toileting  - Record patient progress and toleration of activity level   Outcome: Progressing     Problem: DISCHARGE PLANNING  Goal: Discharge to home or other facility with appropriate resources  Description: INTERVENTIONS:  - Identify barriers to discharge w/patient and caregiver  - Arrange for needed discharge resources and transportation as appropriate  - Identify discharge learning needs (meds, wound care, etc )  - Arrange for interpretive services to assist at discharge as needed  - Refer to Case Management Department for coordinating discharge planning if the patient needs post-hospital services based on physician/advanced practitioner order or complex needs related to functional status, cognitive ability, or social support system  Outcome: Progressing     Problem: Potential for Falls  Goal: Patient will remain free of falls  Description: INTERVENTIONS:  - Educate patient/family on patient safety including physical limitations  - Instruct patient to call for assistance with activity   - Consult OT/PT to assist with strengthening/mobility   - Keep Call bell within reach  - Keep bed low and locked with side rails adjusted as appropriate  - Keep care items and personal belongings within reach  - Initiate and maintain comfort rounds  - Make Fall Risk Sign visible to staff  - Offer Toileting every  Hours, in advance of need  - Initiate/Maintain alarm  - Obtain necessary fall risk management equipment:   - Apply yellow socks and bracelet for high fall risk patients  - Consider moving patient to room near nurses station  Outcome: Progressing     Problem: Knowledge Deficit  Goal: Patient/family/caregiver demonstrates understanding of disease process, treatment plan, medications, and discharge instructions  Description: Complete learning assessment and assess knowledge base    Interventions:  - Provide teaching at level of understanding  - Provide teaching via preferred learning methods  Outcome: Progressing     Problem: DISCHARGE PLANNING  Goal: Discharge to home or other facility with appropriate resources  Description: INTERVENTIONS:  - Identify barriers to discharge w/patient and caregiver  - Arrange for needed discharge resources and transportation as appropriate  - Identify discharge learning needs (meds, wound care, etc )  - Arrange for interpretive services to assist at discharge as needed  - Refer to Case Management Department for coordinating discharge planning if the patient needs post-hospital services based on physician/advanced practitioner order or complex needs related to functional status, cognitive ability, or social support system  Outcome: Progressing No

## 2022-05-31 ENCOUNTER — TRANSCRIPTION ENCOUNTER (OUTPATIENT)
Age: 62
End: 2022-05-31

## 2022-05-31 ENCOUNTER — APPOINTMENT (OUTPATIENT)
Dept: PAIN MANAGEMENT | Facility: CLINIC | Age: 62
End: 2022-05-31
Payer: COMMERCIAL

## 2022-05-31 VITALS — BODY MASS INDEX: 23.95 KG/M2 | HEIGHT: 68 IN | WEIGHT: 158 LBS

## 2022-05-31 PROCEDURE — 99072 ADDL SUPL MATRL&STAF TM PHE: CPT

## 2022-05-31 PROCEDURE — 99214 OFFICE O/P EST MOD 30 MIN: CPT

## 2022-05-31 RX ORDER — GABAPENTIN 300 MG
300 TABLET ORAL
Refills: 0 | Status: ACTIVE | COMMUNITY

## 2022-05-31 NOTE — ASSESSMENT
[FreeTextEntry1] : After discussing various treatment options with the patient including but not limited to oral medications, physical therapy, exercise, modalities as well as interventional spinal injections, we have decided with the following plan:\par \par 1) had acute flare up which has since resolved. \par 2) I would recommend continuation of neuropathic medication as patient presents with signs of nerve irritation. (ie burning, paresthesias etc) Goals of therapy would be to improve pain and overall QOL. Side effects reviewed with patient. Patient will call or stop medication if given side effects occur.\par

## 2022-05-31 NOTE — PHYSICAL EXAM
[] : no palpable masses [de-identified] : left lateral rotation 75 degrees [[id number]] : right lateral rotation 75 degrees

## 2022-05-31 NOTE — HISTORY OF PRESENT ILLNESS
[Neck] : neck [Lower back] : lower back [0] : 0 [Occasional] : occasional [Injection therapy] : injection therapy [FreeTextEntry1] : 05/31/2022: follow up today. had right cervical RFA on 3/7/22 and had relief about 2 weeks following. Last Monday she had pain in right side of neck for 2 days.  Was having headaches.  Now pain is better.  Has not been consistent with gabapentin.  Now back on it.\par \par 2/2/22: follow up today after repeat right cervical MBB on 1/10/22. She does not think she got relief, however does not have her pain diary. Takes Motrin and Excedrin PRN for the headaches. \par \par 12/1/21: follow up today after right cervical MBB on 11/16/21. Had 70% relief from MBB #1. Will schedule repeat.\par \par 10/27/21- Patient did not have much relief from VAISHNAVI. Now complains of pain in both sides of neck. Also has some\par headaches. pain over the right cervical facets. \par \par 9/21/21- Patient still having pain down right arm. Would like VAISHNAVI.\par \par 8/27/21: Patient presents for initial evaluation. She complains of neck and right shoulder pain for the last 3 or so\par months. Pain started without instigation. pain in the right > left axial neck with intermittent radiation down the right\par arm. +n/t in the hands. gets pain that radiates cranially in an occipital nerve distribution. Using Excedrin and Motrin\par PRN. She has not yet started PT. \par \par Subjective Weakness:No\par Numbness/Tingling: Yes\par Bladder/Bowel dysfunction:No\par Gait Abnormalities: Yes/No\par Fine motor coordination changes:No\par Attempted modalities for current pain complaint:\par See above:\par Medications: Yes\par Injections:VAISHNAVI (9/3/21), (10/4/21)\par right cervical mbb (11/16/21, 1/10/22)\par \par Previous Spine Surgery: N/A\par Imaging:\par MRI Cervical Spine (8/12/21) - 1. Straightening of lordosis with diffuse loss of disc signal and multilevel loss of disc\par height.\par 2. C1-C2: Arthrosis. Nonacute post-traumatic deformity to the tip of the odontoid with capsular thickening and\par anterior capsular calcification.\par 3. C2-C3: Bulge.\par 4. C3-C4: Bulge with asymmetric left herniation impressing on the thecal sac. Luschka hypertrophy and facet\par hypertrophy with mild foraminal stenosis.\par 5. C4-C5: Broad bulge, spondylotic ridge, and broad herniation asymmetric to the left impressing on the thecal\par sac with. Luschka hypertrophy and facet hypertrophy with foraminal stenosis right greater than left.\par 6. C5-C6: Broad bulge with asymmetric to right disc osteophyte complex impressing on the thecal sac. Luschka\par hypertrophy with right foraminal stenosis.\par 7. C6-C7: Broad bulge, spondylotic ridge, and asymmetric to right disc osteophyte complex impressing on the\par thecal sac. Luschka hypertrophy with mild foraminal stenosis.\par 8. C7-T1: Broad bulge with central herniation.  [] : no

## 2022-06-20 ENCOUNTER — NON-APPOINTMENT (OUTPATIENT)
Age: 62
End: 2022-06-20

## 2022-06-20 ENCOUNTER — APPOINTMENT (OUTPATIENT)
Dept: DERMATOLOGY | Facility: CLINIC | Age: 62
End: 2022-06-20
Payer: COMMERCIAL

## 2022-06-20 DIAGNOSIS — Z13.89 ENCOUNTER FOR SCREENING FOR OTHER DISORDER: ICD-10-CM

## 2022-06-20 DIAGNOSIS — L82.0 INFLAMED SEBORRHEIC KERATOSIS: ICD-10-CM

## 2022-06-20 PROCEDURE — 99204 OFFICE O/P NEW MOD 45 MIN: CPT | Mod: 25

## 2022-06-20 PROCEDURE — 17110 DESTRUCTION B9 LES UP TO 14: CPT

## 2022-06-20 RX ORDER — TRIAMCINOLONE ACETONIDE 1 MG/G
0.1 CREAM TOPICAL TWICE DAILY
Qty: 1 | Refills: 2 | Status: ACTIVE | COMMUNITY
Start: 2022-06-20 | End: 1900-01-01

## 2022-06-20 NOTE — HISTORY OF PRESENT ILLNESS
[FreeTextEntry1] : spots on skin [de-identified] : 62 year old female with spots on skin. itchy. enlarging.

## 2022-06-20 NOTE — ASSESSMENT
[FreeTextEntry1] : 1) benign findings as above- education\par \par 2) ISK\par The specified lesions were treated with liquid nitrogen cryotherapy.  Discussed risks including pain, blistering, crusting, discoloration, recurrence.\par \par 3) TAC BID PRN pruritus; SED

## 2022-06-20 NOTE — PHYSICAL EXAM
[FreeTextEntry3] : AAOx3, pleasant, NAD, no visual lymphadenopathy\par hair, scalp, face, nose, eyelids, ears, lips, oropharynx, neck, chest, abdomen, back, right arm, left arm, nails, and hands examined with all normal findings,\par pertinent findings include:\par \par multiple benign nevi and lentigines\par + inflamed, waxy, keratotic papule under right breast, on left neck, mid back x2

## 2022-07-06 NOTE — H&P PST ADULT - MEDICATION ADMINISTRATION INFO, PROFILE
Spironolactone Pregnancy And Lactation Text: This medication can cause feminization of the male fetus and should be avoided during pregnancy. The active metabolite is also found in breast milk. Topical Clindamycin Counseling: Patient counseled that this medication may cause skin irritation or allergic reactions.  In the event of skin irritation, the patient was advised to reduce the amount of the drug applied or use it less frequently.   The patient verbalized understanding of the proper use and possible adverse effects of clindamycin.  All of the patient's questions and concerns were addressed. Dapsone Pregnancy And Lactation Text: This medication is Pregnancy Category C and is not considered safe during pregnancy or breast feeding. Minocycline Counseling: Patient advised regarding possible photosensitivity and discoloration of the teeth, skin, lips, tongue and gums.  Patient instructed to avoid sunlight, if possible.  When exposed to sunlight, patients should wear protective clothing, sunglasses, and sunscreen.  The patient was instructed to call the office immediately if the following severe adverse effects occur:  hearing changes, easy bruising/bleeding, severe headache, or vision changes.  The patient verbalized understanding of the proper use and possible adverse effects of minocycline.  All of the patient's questions and concerns were addressed. Bactrim Counseling:  I discussed with the patient the risks of sulfa antibiotics including but not limited to GI upset, allergic reaction, drug rash, diarrhea, dizziness, photosensitivity, and yeast infections.  Rarely, more serious reactions can occur including but not limited to aplastic anemia, agranulocytosis, methemoglobinemia, blood dyscrasias, liver or kidney failure, lung infiltrates or desquamative/blistering drug rashes. Detail Level: Zone Erythromycin Pregnancy And Lactation Text: This medication is Pregnancy Category B and is considered safe during pregnancy. It is also excreted in breast milk. Benzoyl Peroxide Pregnancy And Lactation Text: This medication is Pregnancy Category C. It is unknown if benzoyl peroxide is excreted in breast milk. Spironolactone Counseling: Patient advised regarding risks of diarrhea, abdominal pain, hyperkalemia, birth defects (for female patients), liver toxicity and renal toxicity. The patient may need blood work to monitor liver and kidney function and potassium levels while on therapy. The patient verbalized understanding of the proper use and possible adverse effects of spironolactone.  All of the patient's questions and concerns were addressed. Winlevi Counseling:  I discussed with the patient the risks of topical clascoterone including but not limited to erythema, scaling, itching, and stinging. Patient voiced their understanding. Tazorac Pregnancy And Lactation Text: This medication is not safe during pregnancy. It is unknown if this medication is excreted in breast milk. Dapsone Counseling: I discussed with the patient the risks of dapsone including but not limited to hemolytic anemia, agranulocytosis, rashes, methemoglobinemia, kidney failure, peripheral neuropathy, headaches, GI upset, and liver toxicity.  Patients who start dapsone require monitoring including baseline LFTs and weekly CBCs for the first month, then every month thereafter.  The patient verbalized understanding of the proper use and possible adverse effects of dapsone.  All of the patient's questions and concerns were addressed. High Dose Vitamin A Pregnancy And Lactation Text: High dose vitamin A therapy is contraindicated during pregnancy and breast feeding. Benzoyl Peroxide Counseling: Patient counseled that medicine may cause skin irritation and bleach clothing.  In the event of skin irritation, the patient was advised to reduce the amount of the drug applied or use it less frequently.   The patient verbalized understanding of the proper use and possible adverse effects of benzoyl peroxide.  All of the patient's questions and concerns were addressed. Erythromycin Counseling:  I discussed with the patient the risks of erythromycin including but not limited to GI upset, allergic reaction, drug rash, diarrhea, increase in liver enzymes, and yeast infections. Sarecycline Pregnancy And Lactation Text: This medication is Pregnancy Category D and not consider safe during pregnancy. It is also excreted in breast milk. Topical Sulfur Applications Pregnancy And Lactation Text: This medication is Pregnancy Category C and has an unknown safety profile during pregnancy. It is unknown if this topical medication is excreted in breast milk. Azithromycin Pregnancy And Lactation Text: This medication is considered safe during pregnancy and is also secreted in breast milk. Tazorac Counseling:  Patient advised that medication is irritating and drying.  Patient may need to apply sparingly and wash off after an hour before eventually leaving it on overnight.  The patient verbalized understanding of the proper use and possible adverse effects of tazorac.  All of the patient's questions and concerns were addressed. Use Enhanced Medication Counseling?: No Birth Control Pills Pregnancy And Lactation Text: This medication should be avoided if pregnant and for the first 30 days post-partum. High Dose Vitamin A Counseling: Side effects reviewed, pt to contact office should one occur. Topical Sulfur Applications Counseling: Topical Sulfur Counseling: Patient counseled that this medication may cause skin irritation or allergic reactions.  In the event of skin irritation, the patient was advised to reduce the amount of the drug applied or use it less frequently.   The patient verbalized understanding of the proper use and possible adverse effects of topical sulfur application.  All of the patient's questions and concerns were addressed. Azithromycin Counseling:  I discussed with the patient the risks of azithromycin including but not limited to GI upset, allergic reaction, drug rash, diarrhea, and yeast infections. Topical Retinoid Pregnancy And Lactation Text: This medication is Pregnancy Category C. It is unknown if this medication is excreted in breast milk. Doxycycline Pregnancy And Lactation Text: This medication is Pregnancy Category D and not consider safe during pregnancy. It is also excreted in breast milk but is considered safe for shorter treatment courses. Sarecycline Counseling: Patient advised regarding possible photosensitivity and discoloration of the teeth, skin, lips, tongue and gums.  Patient instructed to avoid sunlight, if possible.  When exposed to sunlight, patients should wear protective clothing, sunglasses, and sunscreen.  The patient was instructed to call the office immediately if the following severe adverse effects occur:  hearing changes, easy bruising/bleeding, severe headache, or vision changes.  The patient verbalized understanding of the proper use and possible adverse effects of sarecycline.  All of the patient's questions and concerns were addressed. Birth Control Pills Counseling: Birth Control Pill Counseling: I discussed with the patient the potential side effects of OCPs including but not limited to increased risk of stroke, heart attack, thrombophlebitis, deep venous thrombosis, hepatic adenomas, breast changes, GI upset, headaches, and depression.  The patient verbalized understanding of the proper use and possible adverse effects of OCPs. All of the patient's questions and concerns were addressed. Isotretinoin Pregnancy And Lactation Text: This medication is Pregnancy Category X and is considered extremely dangerous during pregnancy. It is unknown if it is excreted in breast milk. Tetracycline Counseling: Patient counseled regarding possible photosensitivity and increased risk for sunburn.  Patient instructed to avoid sunlight, if possible.  When exposed to sunlight, patients should wear protective clothing, sunglasses, and sunscreen.  The patient was instructed to call the office immediately if the following severe adverse effects occur:  hearing changes, easy bruising/bleeding, severe headache, or vision changes.  The patient verbalized understanding of the proper use and possible adverse effects of tetracycline.  All of the patient's questions and concerns were addressed. Patient understands to avoid pregnancy while on therapy due to potential birth defects. Topical Clindamycin Pregnancy And Lactation Text: This medication is Pregnancy Category B and is considered safe during pregnancy. It is unknown if it is excreted in breast milk. Doxycycline Counseling:  Patient counseled regarding possible photosensitivity and increased risk for sunburn.  Patient instructed to avoid sunlight, if possible.  When exposed to sunlight, patients should wear protective clothing, sunglasses, and sunscreen.  The patient was instructed to call the office immediately if the following severe adverse effects occur:  hearing changes, easy bruising/bleeding, severe headache, or vision changes.  The patient verbalized understanding of the proper use and possible adverse effects of doxycycline.  All of the patient's questions and concerns were addressed. Winlevi Pregnancy And Lactation Text: This medication is considered safe during pregnancy and breastfeeding. Bactrim Pregnancy And Lactation Text: This medication is Pregnancy Category D and is known to cause fetal risk.  It is also excreted in breast milk. no concerns Isotretinoin Counseling: Patient should get monthly blood tests, not donate blood, not drive at night if vision affected, not share medication, and not undergo elective surgery for 6 months after tx completed. Side effects reviewed, pt to contact office should one occur. Topical Retinoid counseling:  Patient advised to apply a pea-sized amount only at bedtime and wait 30 minutes after washing their face before applying.  If too drying, patient may add a non-comedogenic moisturizer. The patient verbalized understanding of the proper use and possible adverse effects of retinoids.  All of the patient's questions and concerns were addressed.

## 2022-07-12 ENCOUNTER — APPOINTMENT (OUTPATIENT)
Dept: DERMATOLOGY | Facility: CLINIC | Age: 62
End: 2022-07-12

## 2022-07-12 DIAGNOSIS — L81.4 OTHER MELANIN HYPERPIGMENTATION: ICD-10-CM

## 2022-07-12 DIAGNOSIS — Z12.83 ENCOUNTER FOR SCREENING FOR MALIGNANT NEOPLASM OF SKIN: ICD-10-CM

## 2022-07-12 DIAGNOSIS — L82.1 OTHER SEBORRHEIC KERATOSIS: ICD-10-CM

## 2022-07-12 DIAGNOSIS — D22.9 MELANOCYTIC NEVI, UNSPECIFIED: ICD-10-CM

## 2022-07-12 PROCEDURE — 11102 TANGNTL BX SKIN SINGLE LES: CPT

## 2022-07-12 PROCEDURE — 99213 OFFICE O/P EST LOW 20 MIN: CPT | Mod: 25

## 2022-07-12 RX ORDER — MECLIZINE HYDROCHLORIDE 25 MG/1
25 TABLET ORAL
Qty: 30 | Refills: 0 | Status: ACTIVE | COMMUNITY
Start: 2022-04-20

## 2022-07-12 RX ORDER — VALACYCLOVIR 500 MG/1
500 TABLET, FILM COATED ORAL
Qty: 30 | Refills: 0 | Status: ACTIVE | COMMUNITY
Start: 2022-04-27

## 2022-07-12 RX ORDER — COVID-19 ANTIGEN TEST
KIT MISCELLANEOUS
Qty: 8 | Refills: 0 | Status: ACTIVE | COMMUNITY
Start: 2022-06-07

## 2022-07-12 RX ORDER — MOLNUPIRAVIR 200 MG/1
200 CAPSULE ORAL
Qty: 40 | Refills: 0 | Status: ACTIVE | COMMUNITY
Start: 2022-06-07

## 2022-07-12 RX ORDER — NORETHINDRONE ACETATE AND ETHINYL ESTRADIOL 1; .005 MG/1; MG/1
1-5 TABLET ORAL
Qty: 30 | Refills: 0 | Status: ACTIVE | COMMUNITY
Start: 2022-07-07

## 2022-07-12 RX ORDER — IBUPROFEN 800 MG/1
800 TABLET, FILM COATED ORAL
Qty: 60 | Refills: 0 | Status: ACTIVE | COMMUNITY
Start: 2022-03-10

## 2022-09-21 ENCOUNTER — APPOINTMENT (OUTPATIENT)
Dept: PAIN MANAGEMENT | Facility: CLINIC | Age: 62
End: 2022-09-21

## 2022-09-21 VITALS — WEIGHT: 150 LBS | HEIGHT: 68 IN | BODY MASS INDEX: 22.73 KG/M2

## 2022-09-21 PROCEDURE — 99214 OFFICE O/P EST MOD 30 MIN: CPT

## 2022-09-21 RX ORDER — PIROXICAM 20 MG/1
20 CAPSULE ORAL
Qty: 30 | Refills: 5 | Status: ACTIVE | COMMUNITY
Start: 2022-04-26 | End: 1900-01-01

## 2022-09-21 RX ORDER — GABAPENTIN 300 MG/1
300 CAPSULE ORAL 3 TIMES DAILY
Qty: 270 | Refills: 0 | Status: ACTIVE | COMMUNITY
Start: 2022-05-31 | End: 1900-01-01

## 2022-09-21 NOTE — HISTORY OF PRESENT ILLNESS
Bedside shift change report given to ALTON Funes RN (oncoming nurse) by ALTON Salomon RN (offgoing nurse).  Report included the following information SBAR, MAR and Recent Results.  
  
 
 
 [Neck] : neck [Lower back] : lower back [Injection therapy] : injection therapy [8] : 8 [3] : 3 [Dull/Aching] : dull/aching [Constant] : constant [Household chores] : household chores [Sleep] : sleep [Rest] : rest [FreeTextEntry1] : 09/21/2022: follow up today. taking gabapentin but not as often as she should.  Will schedule repeat right cervical RFA.\par  \par 05/31/2022: follow up today. had right cervical RFA on 3/7/22 and had relief about 2 weeks following. Last Monday she had pain in right side of neck for 2 days.  Was having headaches.  Now pain is better.  Has not been consistent with gabapentin.  Now back on it.\par \par 2/2/22: follow up today after repeat right cervical MBB on 1/10/22. She does not think she got relief, however does not have her pain diary. Takes Motrin and Excedrin PRN for the headaches. \par \par 12/1/21: follow up today after right cervical MBB on 11/16/21. Had 70% relief from MBB #1. Will schedule repeat.\par \par 10/27/21- Patient did not have much relief from VAISHNAVI. Now complains of pain in both sides of neck. Also has some\par headaches. pain over the right cervical facets. \par \par 9/21/21- Patient still having pain down right arm. Would like VAISHNAVI.\par \par 8/27/21: Patient presents for initial evaluation. She complains of neck and right shoulder pain for the last 3 or so\par months. Pain started without instigation. pain in the right > left axial neck with intermittent radiation down the right\par arm. +n/t in the hands. gets pain that radiates cranially in an occipital nerve distribution. Using Excedrin and Motrin\par PRN. She has not yet started PT. \par \par Subjective Weakness:No\par Numbness/Tingling: Yes\par Bladder/Bowel dysfunction:No\par Gait Abnormalities: Yes/No\par Fine motor coordination changes:No\par Attempted modalities for current pain complaint:\par See above:\par Medications: Yes\par Injections:VAISHNAVI (9/3/21), (10/4/21)\par right cervical mbb (11/16/21, 1/10/22)\par \par Previous Spine Surgery: N/A\par Imaging:\par MRI Cervical Spine (8/12/21) - 1. Straightening of lordosis with diffuse loss of disc signal and multilevel loss of disc\par height.\par 2. C1-C2: Arthrosis. Nonacute post-traumatic deformity to the tip of the odontoid with capsular thickening and\par anterior capsular calcification.\par 3. C2-C3: Bulge.\par 4. C3-C4: Bulge with asymmetric left herniation impressing on the thecal sac. Luschka hypertrophy and facet\par hypertrophy with mild foraminal stenosis.\par 5. C4-C5: Broad bulge, spondylotic ridge, and broad herniation asymmetric to the left impressing on the thecal\par sac with. Luschka hypertrophy and facet hypertrophy with foraminal stenosis right greater than left.\par 6. C5-C6: Broad bulge with asymmetric to right disc osteophyte complex impressing on the thecal sac. Luschka\par hypertrophy with right foraminal stenosis.\par 7. C6-C7: Broad bulge, spondylotic ridge, and asymmetric to right disc osteophyte complex impressing on the\par thecal sac. Luschka hypertrophy with mild foraminal stenosis.\par 8. C7-T1: Broad bulge with central herniation.  [] : no [FreeTextEntry7] : B/l  shoulder [de-identified] : activity

## 2022-09-21 NOTE — ASSESSMENT
[FreeTextEntry1] : After discussing various treatment options with the patient including but not limited to oral medications, physical therapy, exercise, modalities as well as interventional spinal injections, we have decided with the following plan:\par \par Patient presents with axial cervical pain that has not responded to  3 months of conservative therapy including physical therapy or NSAID therapy.  The pain is interfering with activities of daily living and functionality.   There is no radicular pain.   The pain is exacerbated by facet loading.  The patient has not had a vertebral fusion at the levels of the proposed treatment.  There is no unexplained neurologic deficit.  There is no history of systemic infection, unstable medical condition, bleeding tendency, or local infection.  The injection is being performed to diagnose the facet joint as the source of the individual's pain.\par \par \par

## 2022-09-21 NOTE — PHYSICAL EXAM
[] : no palpable masses [de-identified] : left lateral rotation 75 degrees [[id number]] : right lateral rotation 75 degrees

## 2022-10-03 ENCOUNTER — APPOINTMENT (OUTPATIENT)
Dept: PAIN MANAGEMENT | Facility: CLINIC | Age: 62
End: 2022-10-03

## 2022-10-05 ENCOUNTER — APPOINTMENT (OUTPATIENT)
Dept: PAIN MANAGEMENT | Facility: CLINIC | Age: 62
End: 2022-10-05

## 2022-10-05 VITALS — WEIGHT: 150 LBS | BODY MASS INDEX: 22.73 KG/M2 | HEIGHT: 68 IN

## 2022-10-05 PROCEDURE — 99214 OFFICE O/P EST MOD 30 MIN: CPT | Mod: 25

## 2022-10-05 PROCEDURE — 20553 NJX 1/MLT TRIGGER POINTS 3/>: CPT

## 2022-10-05 PROCEDURE — J3490M: CUSTOM

## 2022-10-05 NOTE — HISTORY OF PRESENT ILLNESS
[Neck] : neck [Lower back] : lower back [Dull/Aching] : dull/aching [Constant] : constant [Household chores] : household chores [Sleep] : sleep [Rest] : rest [9] : 9 [7] : 7 [Injection therapy] : injection therapy [] : no [FreeTextEntry1] : left side of the neck  [FreeTextEntry7] : left shoulder  [FreeTextEntry9] : changing positions  [de-identified] : activity, computer work

## 2022-10-05 NOTE — PHYSICAL EXAM
[] : no palpable masses [de-identified] : left lateral rotation 75 degrees [[id number]] : right lateral rotation 75 degrees

## 2022-10-05 NOTE — PROCEDURE
[Left] : of the left [Cervical paraspinal muscle] : cervical paraspinal muscle [Trapezius muscle] : trapezius muscle

## 2022-10-07 ENCOUNTER — APPOINTMENT (OUTPATIENT)
Dept: PAIN MANAGEMENT | Facility: CLINIC | Age: 62
End: 2022-10-07

## 2022-10-07 PROCEDURE — 64491 INJ PARAVERT F JNT C/T 2 LEV: CPT | Mod: 59,LT

## 2022-10-07 PROCEDURE — 64490 INJ PARAVERT F JNT C/T 1 LEV: CPT | Mod: LT

## 2022-10-07 PROCEDURE — J3490M: CUSTOM

## 2022-10-07 PROCEDURE — 64492 INJ PARAVERT F JNT C/T 3 LEV: CPT | Mod: 59,LT

## 2022-10-07 NOTE — PROCEDURE
[FreeTextEntry3] : Date of Service: 10/07/2022 \par \par Account: [id number]\par \par Patient: HANNAH KAUFFMAN \par \par YOB: 1960\par \par Age: 62 year\par \par \par Surgeon: Ольга Crawford M.D.\par \par Assistant: None.\par \par Pre-Operative Diagnosis: Spondylosis of Cervical Region without Myelopathy or Radiculopathy (M47.812)\par \par Post Operative Diagnosis: Spondylosis of Cervical Region without Myelopathy or Radiculopathy (M47.812)\par \par Procedure: Left C3-4,C4-5,C5-6,   Facet block under fluoroscopic guidance.                    \par \par Anesthesia:     MAC\par \par \par This procedure was carried out using fluoroscopic guidance.  The risks and benefits of the procedure were discussed extensively with the patient.  The consent of the patient was obtained and the following procedure was performed.\par \par The patient was placed in the prone position.  The patient's neck was prepped and draped in a sterile fashion.  The C3,C4,C5,C6 vertebral levels were identified and the fluoroscope left oblique to approximately 10 degrees to reveal the "waste" of the left articular pillars at the C3,C4,C5,C6  levels and these were marked.  The skin at these target points was then localized using 1 cc of 1% Lidocaine without epinephrine at each injection site.  A 25 Gauge 3 ½ inch spinal needle was then introduced and advanced at these three levels to the above target points, and under lateral view the needle tip was advanced a few millimeters and confirmed at the middle of the classical "trapezius" structure on oss. After negative aspiration for heme and CSF, an injectate of 1cc 0.25% marcaine and Celestone was injected at each of the four injection sites.\par \par The needles were then removed and pressure was applied.  Anesthesia personnel were present throughout the procedure.\par \par The patient was instructed to apply ice over the injection site for twenty minutes every two hours for the next 24 to 48 hours.  The patient was also instructed to contact me immediately if there were any problems.\par \par Ольга Crawford M.D.

## 2022-10-26 ENCOUNTER — APPOINTMENT (OUTPATIENT)
Dept: PAIN MANAGEMENT | Facility: CLINIC | Age: 62
End: 2022-10-26

## 2022-11-02 ENCOUNTER — APPOINTMENT (OUTPATIENT)
Dept: PAIN MANAGEMENT | Facility: CLINIC | Age: 62
End: 2022-11-02

## 2022-11-02 VITALS — WEIGHT: 150 LBS | BODY MASS INDEX: 22.73 KG/M2 | HEIGHT: 68 IN

## 2022-11-02 PROCEDURE — 99214 OFFICE O/P EST MOD 30 MIN: CPT

## 2022-11-02 NOTE — PHYSICAL EXAM
[] : no palpable masses [de-identified] : left lateral rotation 75 degrees [[id number]] : right lateral rotation 75 degrees

## 2022-11-02 NOTE — ASSESSMENT
[FreeTextEntry1] : After discussing various treatment options with the patient including but not limited to oral medications, physical therapy, exercise, modalities as well as interventional spinal injections, we have decided with the following plan:\par \par 1) Intervention Injection Therapy:\par I personally reviewed the MRI/CT scan images and agree with the radiologist's report. The radiological findings were discussed with the patient.\par The risks, benefits, contents and alternatives to injection were explained in full to the patient. Risks outlined include but are not limited to infection,sepsis, bleeding, post-dural puncture headache, nerve damage, temporary increase in pain, syncopal episode, failure to resolve symptoms, allergic reaction, symptom recurrence, and elevation of blood sugar in diabetics. Cortisone may cause immunosuppression. Patient understands the risks. All questions were answered. After discussion of options, patient requested an injection. Information regarding the injection was given to the patient. Which medications to stop prior to the injection was explained to the patient as well.\par \par Follow up in 1-2 weeks post injection for re-evaluation. \par Continue Home exercises, stretching, activity modification, physical therapy, and conservative care.\par \par Patient presents with axial cervical pain that has not responded to  3 months of conservative therapy including physical therapy or NSAID therapy.  The pain is interfering with activities of daily living and functionality.   There is no radicular pain.   The pain is exacerbated by facet loading.  The patient has not had a vertebral fusion at the levels of the proposed treatment.  There is no unexplained neurologic deficit.  There is no history of systemic infection, unstable medical condition, bleeding tendency, or local infection.  The injection is being performed to diagnose the facet joint as the source of the individual's pain.\par  \par \par left cervical mbb #2\par \par

## 2022-11-02 NOTE — HISTORY OF PRESENT ILLNESS
[Neck] : neck [Lower back] : lower back [7] : 7 [Dull/Aching] : dull/aching [Household chores] : household chores [Sleep] : sleep [Rest] : rest [Injection therapy] : injection therapy [6] : 6 [Intermittent] : intermittent [] : no [FreeTextEntry1] : left side of the neck  [FreeTextEntry9] : changing positions  [de-identified] : activity, computer work

## 2022-11-04 ENCOUNTER — APPOINTMENT (OUTPATIENT)
Dept: PAIN MANAGEMENT | Facility: CLINIC | Age: 62
End: 2022-11-04

## 2022-11-04 PROCEDURE — 99072 ADDL SUPL MATRL&STAF TM PHE: CPT

## 2022-11-04 PROCEDURE — 64633 DESTROY CERV/THOR FACET JNT: CPT | Mod: RT

## 2022-11-04 PROCEDURE — 64634Z: CUSTOM | Mod: 59,RT

## 2022-11-04 NOTE — PROCEDURE
[FreeTextEntry3] : Date of Service: 11/04/2022 \par \par Account: [id number]\par \par Patient: HANNAH KAUFFMAN \par \par YOB: 1960\par \par Age: 62 year\par \par \par RIGHT RFA CERVICAL UPPER FACET\par \par Surgeon: Ольга Crawford M.D.\par \par Assistant: None.\par \par Pre-Operative Diagnosis: \par \par Post Operative Diagnosis: \par \par Procedure: Right Third Occipital Nerve, C3, C4, C5 Medial Branch Radiofrequency Neurolysis under fluoroscopic guidance.               \par \par Anesthesia:             MAC\par \par \par This procedure was carried out using fluoroscopic guidance.  The risks and benefits of the procedure were discussed extensively with the patient.  The consent of the patient was obtained and the following procedure was performed.\par \par The patient was placed in the prone position.  The patient's neck was prepped and draped in a sterile fashion.  The C2,C3,C4,C5 vertebral levels were identified and the fluoroscope right obliqued to approximately 10 degrees to reveal the "waste" of the left articular pillars at the C2,C3,C4,C5 levels and these were marked.  The skin at these target points was then localized using 1 cc of 1% Lidocaine without epinephrine at each injection site.  A 20 Gauge 100mm david needle with a 5mm active curved tip was then introduced and advanced at these four levels to the above target points, and under lateral view the needle tip was advanced a few millimeters and confirmed at the middle of the classical "trapezius" structure on oss. \par \par The stylette for the most cephalad needle at the C2 level was then removed and a David radiofrequency probe was then placed inside the needle. After impedences were checked at approximately 240 ohm, 50 hertz sensory stimulation was performed.  Patient experienced concordant pain in their right neck at approximately 0.6 volts. The voltage was then increased to 1 volt. The patient reported increased right neck pain symptoms without any radiation below the left shoulder.  Stimulation was then changed to 2 hertz and increased slowly by 0.1 volt increments at approximately 0.8 volts, patient began to experience thumping like reproductive pain in their right neck. The voltage was then increased to approximately 2.5 volts. Patient experienced increasing thumping without any sensation below his right shoulder. This exact stimulation was then repeated for the C3,C4,C5 needles reproductive right sided neck pain and no radiation below the right shoulder. The 4 levels were then anesthetized with approximately 0.5 cc's of 1% Lidocaine. After which each area was then ablated at 80 degrees centigrade for 60 seconds each. Patient felt no pain reproduction during the ablation procedure.  After each of these levels were ablated, approximately 0.5 cc of 0.25% Bupivacaine plus 12 mg of Celestone were then injected before the needles were removed.  Pressure was then applied to the neck.  Band-aids were applied.  Patient was brought to the recovery, ambulated on their own after the procedure and reported decreased right sided neck pain. \par \par Anesthesia personnel were present throughout the procedure. Patient was told to apply ice to the neck for 20 minutes on and 20 minutes off for 24-48 hours. Patient is to call the office if they had any questions or concerns.   The patient was also instructed to contact me immediately if there were any problems.\par \par Ольга Crawford M.D.

## 2022-11-14 ENCOUNTER — APPOINTMENT (OUTPATIENT)
Dept: PAIN MANAGEMENT | Facility: CLINIC | Age: 62
End: 2022-11-14

## 2022-11-14 PROCEDURE — 64492 INJ PARAVERT F JNT C/T 3 LEV: CPT | Mod: 59,LT

## 2022-11-14 PROCEDURE — 64490 INJ PARAVERT F JNT C/T 1 LEV: CPT | Mod: LT

## 2022-11-14 PROCEDURE — 64491 INJ PARAVERT F JNT C/T 2 LEV: CPT | Mod: 59,LT

## 2022-11-14 PROCEDURE — J3490M: CUSTOM

## 2022-11-14 NOTE — PROCEDURE
[FreeTextEntry3] : Date of Service: 11/14/2022 \par \par Account: [id number]\par \par Patient: HANNAH KAUFFMAN \par \par YOB: 1960\par \par Age: 62 year\par \par \par Surgeon: Ольга Crawford M.D.\par \par Assistant: None.\par \par Pre-Operative Diagnosis: Spondylosis of Cervical Region without Myelopathy or Radiculopathy (M47.812)\par \par Post Operative Diagnosis: Spondylosis of Cervical Region without Myelopathy or Radiculopathy (M47.812)\par \par Procedure: Left C3-4,C4-5,C5-6,   Facet block under fluoroscopic guidance.                    \par \par Anesthesia:     MAC\par \par \par This procedure was carried out using fluoroscopic guidance.  The risks and benefits of the procedure were discussed extensively with the patient.  The consent of the patient was obtained and the following procedure was performed.\par \par The patient was placed in the prone position.  The patient's neck was prepped and draped in a sterile fashion.  The C3,C4,C5,C6 vertebral levels were identified and the fluoroscope left oblique to approximately 10 degrees to reveal the "waste" of the left articular pillars at the C3,C4,C5,C6  levels and these were marked.  The skin at these target points was then localized using 1 cc of 1% Lidocaine without epinephrine at each injection site.  A 25 Gauge 3 ½ inch spinal needle was then introduced and advanced at these three levels to the above target points, and under lateral view the needle tip was advanced a few millimeters and confirmed at the middle of the classical "trapezius" structure on oss. After negative aspiration for heme and CSF, an injectate of 1cc 0.25% marcaine and Celestone was injected at each of the four injection sites.\par \par The needles were then removed and pressure was applied.  Anesthesia personnel were present throughout the procedure.\par \par The patient was instructed to apply ice over the injection site for twenty minutes every two hours for the next 24 to 48 hours.  The patient was also instructed to contact me immediately if there were any problems.\par \par Ольга Crawford M.D.\par

## 2022-11-18 ENCOUNTER — APPOINTMENT (OUTPATIENT)
Dept: ORTHOPEDIC SURGERY | Facility: CLINIC | Age: 62
End: 2022-11-18

## 2022-11-18 VITALS — HEIGHT: 68 IN | WEIGHT: 150 LBS | BODY MASS INDEX: 22.73 KG/M2

## 2022-11-18 PROCEDURE — 99455 WORK RELATED DISABILITY EXAM: CPT

## 2022-11-18 PROCEDURE — 99072 ADDL SUPL MATRL&STAF TM PHE: CPT

## 2022-11-20 NOTE — PHYSICAL EXAM
[de-identified] : R hand: \par Mild swelling \par Tender 4th A1 pulley \par Decreased ring ROM \par

## 2022-11-20 NOTE — HISTORY OF PRESENT ILLNESS
[Work related] : work related [5] : 5 [4] : 4 [Dull/Aching] : dull/aching [Full time] : Work status: full time [de-identified] : R RF still has stiffness after trigger release\par  [FreeTextEntry1] : R hand [de-identified] : none

## 2022-11-29 ENCOUNTER — APPOINTMENT (OUTPATIENT)
Dept: PAIN MANAGEMENT | Facility: CLINIC | Age: 62
End: 2022-11-29

## 2022-11-29 VITALS — BODY MASS INDEX: 22.73 KG/M2 | HEIGHT: 68 IN | WEIGHT: 150 LBS

## 2022-11-29 PROCEDURE — 99214 OFFICE O/P EST MOD 30 MIN: CPT | Mod: 25

## 2022-11-29 PROCEDURE — 20553 NJX 1/MLT TRIGGER POINTS 3/>: CPT

## 2022-11-29 PROCEDURE — J3490M: CUSTOM

## 2022-11-29 RX ORDER — PIROXICAM 20 MG/1
20 CAPSULE ORAL DAILY
Qty: 30 | Refills: 3 | Status: ACTIVE | COMMUNITY
Start: 2022-11-29 | End: 1900-01-01

## 2022-11-29 NOTE — PROCEDURE
[Cervical paraspinal muscle] : cervical paraspinal muscle [Trigger point 1-2 muscle groups] : trigger point 1-2 muscle groups [___ cc    1%] : Lidocaine ~Vcc of 1%  [___ cc    0.25%] : Bupivacaine (Marcaine) ~Vcc of 0.25%  [___ cc    10mg] : Triamcinolone (Kenalog) ~Vcc of 10 mg  [Risks, benefits, alternatives discussed / Verbal consent obtained] : the risks benefits, and alternatives have been discussed, and verbal consent was obtained

## 2022-11-29 NOTE — HISTORY OF PRESENT ILLNESS
[Neck] : neck [Lower back] : lower back [Dull/Aching] : dull/aching [Intermittent] : intermittent [Household chores] : household chores [Sleep] : sleep [Rest] : rest [Injection therapy] : injection therapy [5] : 5 [2] : 2 [] : no [FreeTextEntry1] : left side of the neck  [FreeTextEntry7] : right side up to the head  [FreeTextEntry9] : changing positions  [de-identified] : activity, computer work

## 2022-11-29 NOTE — ASSESSMENT
[FreeTextEntry1] : After discussing various treatment options with the patient including but not limited to oral medications, physical therapy, exercise, modalities as well as interventional spinal injections, we have decided with the following plan:\par \par 1) Intervention Injection Therapy:\par I personally reviewed the MRI/CT scan images and agree with the radiologist's report. The radiological findings were discussed with the patient.\par The risks, benefits, contents and alternatives to injection were explained in full to the patient. Risks outlined include but are not limited to infection,sepsis, bleeding, post-dural puncture headache, nerve damage, temporary increase in pain, syncopal episode, failure to resolve symptoms, allergic reaction, symptom recurrence, and elevation of blood sugar in diabetics. Cortisone may cause immunosuppression. Patient understands the risks. All questions were answered. After discussion of options, patient requested an injection. Information regarding the injection was given to the patient. Which medications to stop prior to the injection was explained to the patient as well.\par \par Follow up in 1-2 weeks post injection for re-evaluation. \par Continue Home exercises, stretching, activity modification, physical therapy, and conservative care.\par \par Patient presents with axial cervical pain that has not responded to  3 months of conservative therapy including physical therapy or NSAID therapy.  The pain is interfering with activities of daily living and functionality.   There is no radicular pain.   The pain is exacerbated by facet loading.  The patient has not had a vertebral fusion at the levels of the proposed treatment.  There is no unexplained neurologic deficit.  There is no history of systemic infection, unstable medical condition, bleeding tendency, or local infection.  The injection is being performed to diagnose the facet joint as the source of the individual's pain.\par  \par \par left cervical RFA - will call\par \par 2) The risks, benefits, contents and alternatives to injection were explained in full to the patient.  Risks outlined include but are not limited to infection, sepsis, bleeding, scarring, skin discoloration, temporary increase in pain, syncopal episode, failure to resolve symptoms, allergic reaction, flare reaction, permanent white skin discoloration, symptom recurrence, and elevation of blood sugar in diabetics.  Patient understood the risks.  All questions were answered.  After discussion of options, patient requested an injection.  Oral informed consent was obtained and sterile prep was done of the injection site.  Sterile technique was used to introduce the mixture. The mixture consisted of 3 cc 1% lidocaine, 3cc 0.25% marcaine, and 10mg of kenalog.  Patient tolerated the procedure well.  Patient advised to ice the injection site this evening.  Signs and symptoms of infection reviewed and patient advised to call immediately for redness, fevers, and/or chills.\par \par 3) I advised HANNAH that the NSAID should be taken with food.  In addition while taking the prescribed NSAID, no over the counter or other NSAIDs should be used, such as ibuprofen (Motrin or Advil) or naproxen (Aleve) as this can cause stomach upset or other side effects.  If needed for fever or breakthrough pain Tylenol can be used. \par \par

## 2022-11-29 NOTE — PHYSICAL EXAM
[] : no palpable masses [de-identified] : left lateral rotation 60 degrees [[id number]] : right lateral rotation 60 degrees

## 2023-02-21 NOTE — PROCEDURE
[Trigger point 1-2 muscle groups] : trigger point 1-2 muscle groups [Cervical paraspinal muscle] : cervical paraspinal muscle [___ cc    1%] : Lidocaine ~Vcc of 1%  [___ cc    0.25%] : Bupivacaine (Marcaine) ~Vcc of 0.25%  [___ cc    10mg] : Triamcinolone (Kenalog) ~Vcc of 10 mg  [Risks, benefits, alternatives discussed / Verbal consent obtained] : the risks benefits, and alternatives have been discussed, and verbal consent was obtained

## 2023-02-22 ENCOUNTER — APPOINTMENT (OUTPATIENT)
Dept: PAIN MANAGEMENT | Facility: CLINIC | Age: 63
End: 2023-02-22
Payer: COMMERCIAL

## 2023-02-22 VITALS — BODY MASS INDEX: 22.73 KG/M2 | HEIGHT: 68 IN | WEIGHT: 150 LBS

## 2023-02-22 PROCEDURE — 99214 OFFICE O/P EST MOD 30 MIN: CPT | Mod: 25

## 2023-02-22 PROCEDURE — 20552 NJX 1/MLT TRIGGER POINT 1/2: CPT

## 2023-02-22 PROCEDURE — J3490M: CUSTOM

## 2023-02-22 NOTE — PHYSICAL EXAM
[] : no palpable masses [de-identified] : left lateral rotation 60 degrees [[id number]] : right lateral rotation 60 degrees

## 2023-02-22 NOTE — HISTORY OF PRESENT ILLNESS
[Neck] : neck [Lower back] : lower back [2] : 2 [Dull/Aching] : dull/aching [Intermittent] : intermittent [Household chores] : household chores [Sleep] : sleep [Rest] : rest [Injection therapy] : injection therapy [4] : 4 [] : no [FreeTextEntry1] : left side of the neck  [FreeTextEntry7] : right side up to the head  [FreeTextEntry9] : changing positions  [de-identified] : activity, computer work

## 2023-04-05 ENCOUNTER — APPOINTMENT (OUTPATIENT)
Dept: PAIN MANAGEMENT | Facility: CLINIC | Age: 63
End: 2023-04-05

## 2023-04-17 ENCOUNTER — APPOINTMENT (OUTPATIENT)
Dept: ORTHOPEDIC SURGERY | Facility: CLINIC | Age: 63
End: 2023-04-17
Payer: OTHER MISCELLANEOUS

## 2023-04-17 VITALS — HEIGHT: 68 IN | BODY MASS INDEX: 22.58 KG/M2 | WEIGHT: 149 LBS

## 2023-04-17 DIAGNOSIS — S40.012A CONTUSION OF LEFT SHOULDER, INITIAL ENCOUNTER: ICD-10-CM

## 2023-04-17 DIAGNOSIS — M75.82 OTHER SHOULDER LESIONS, LEFT SHOULDER: ICD-10-CM

## 2023-04-17 PROCEDURE — 20611 DRAIN/INJ JOINT/BURSA W/US: CPT | Mod: LT

## 2023-04-17 PROCEDURE — 73030 X-RAY EXAM OF SHOULDER: CPT | Mod: LT

## 2023-04-17 PROCEDURE — 99214 OFFICE O/P EST MOD 30 MIN: CPT | Mod: 25

## 2023-04-17 PROCEDURE — J3490M: CUSTOM

## 2023-04-17 NOTE — PROCEDURE
[Large Joint Injection] : Large joint injection [Left] : of the left [Subacromial Space] : subacromial space [Pain] : pain [Inflammation] : inflammation [Alcohol] : alcohol [Betadine] : betadine [Ethyl Chloride sprayed topically] : ethyl chloride sprayed topically [Sterile technique used] : sterile technique used [___ cc    6mg] :  Betamethasone (Celestone) ~Vcc of 6mg [___ cc    1%] : Lidocaine ~Vcc of 1%  [___ cc    0.5%] : Bupivacaine (Marcaine) ~Vcc of 0.5%  [] : Patient tolerated procedure well [Call if redness, pain or fever occur] : call if redness, pain or fever occur [Apply ice for 15min out of every hour for the next 12-24 hours as tolerated] : apply ice for 15 minutes out of every hour for the next 12-24 hours as tolerated [Patient had decreased mobility in the joint] : patient had decreased mobility in the joint [Risks, benefits, alternatives discussed / Verbal consent obtained] : the risks benefits, and alternatives have been discussed, and verbal consent was obtained [Prior failure or difficult injection] : prior failure or difficult injection [All ultrasound images have been permanently captured and stored accordingly in our picture archiving and communication system] : All ultrasound images have been permanently captured and stored accordingly in our picture archiving and communication system [Visualization of the needle and placement of injection was performed without complication] : visualization of the needle and placement of injection was performed without complication

## 2023-04-17 NOTE — DISCUSSION/SUMMARY
[de-identified] : General Dx discussion\par The patient was advised of the diagnosis. The natural history of the pathology was explained in full to the patient in layman's terms. All questions were answered. The risks and benefits of surgical and non-surgical treatment alternatives were explained in full to the patient. \par \par Case discussed.\par Cortisone injection today tolerated well.\par She will be sent for an MRI t or/o a retear of the RTC.\par Follow up with  after MRI. \par \par Entered by DOROTHY Farris acting as scribe.\par -\par The documentation recorded by the scribe accurately reflects the service I personally performed and the decisions made by me.\par

## 2023-04-17 NOTE — HISTORY OF PRESENT ILLNESS
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  [] : no [FreeTextEntry1] : Left shoulder [FreeTextEntry3] : 4/2/23 [FreeTextEntry7] : to her elbow [de-identified] : lifting [de-identified] : Dr Guillaume

## 2023-04-17 NOTE — WORK
[Sprain/Strain] : sprain/strain [Was the competent medical cause of the injury] : was the competent medical cause of the injury [Are consistent with the injury] : are consistent with the injury [Consistent with my objective findings] : consistent with my objective findings [Partial] : partial [Reveals pre-existing condition(s) that may affect treatment/prognosis] : reveals pre-existing condition(s) that may affect treatment/prognosis [Can return to work with limitations on ______] : can return to work with limitations on [unfilled] [Lifting] : lifting [Pulling/Pushing] : pulling/pushing [Reaching overhead] : reaching overhead [Unknown at this time] : : unknown at this time [Patient] : patient [No Rx restrictions] : No Rx restrictions. [I provided the services listed above] :  I provided the services listed above. [FreeTextEntry1] : guarded pending MRI [FreeTextEntry2] : h/o 2 surgeries

## 2023-04-17 NOTE — PHYSICAL EXAM
[Left] : left shoulder [5 ___] : forward flexion 5[unfilled]/5 [5___] : internal rotation 5[unfilled]/5 [] : no erythema [TWNoteComboBox7] : active forward flexion 110 degrees [de-identified] : active abduction 80 degrees [TWNoteComboBox6] : internal rotation lateral hip [de-identified] : external rotation 60 degrees

## 2023-04-17 NOTE — REASON FOR VISIT
[FreeTextEntry2] : WC New Injury- 61yo RHD female with left shoulder pain. 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 She has had 2 surgeries with  most recent was in 2017. The shoulder was doing fine until this injury,

## 2023-04-19 ENCOUNTER — FORM ENCOUNTER (OUTPATIENT)
Age: 63
End: 2023-04-19

## 2023-04-20 ENCOUNTER — APPOINTMENT (OUTPATIENT)
Dept: MRI IMAGING | Facility: CLINIC | Age: 63
End: 2023-04-20
Payer: OTHER MISCELLANEOUS

## 2023-04-20 PROCEDURE — 73221 MRI JOINT UPR EXTREM W/O DYE: CPT | Mod: LT

## 2023-04-26 ENCOUNTER — APPOINTMENT (OUTPATIENT)
Dept: ORTHOPEDIC SURGERY | Facility: CLINIC | Age: 63
End: 2023-04-26
Payer: OTHER MISCELLANEOUS

## 2023-04-26 VITALS — WEIGHT: 149 LBS | BODY MASS INDEX: 22.58 KG/M2 | HEIGHT: 68 IN

## 2023-04-26 DIAGNOSIS — M25.612 STIFFNESS OF LEFT SHOULDER, NOT ELSEWHERE CLASSIFIED: ICD-10-CM

## 2023-04-26 DIAGNOSIS — M75.42 IMPINGEMENT SYNDROME OF LEFT SHOULDER: ICD-10-CM

## 2023-04-26 DIAGNOSIS — Z00.00 ENCOUNTER FOR GENERAL ADULT MEDICAL EXAMINATION W/OUT ABNORMAL FINDINGS: ICD-10-CM

## 2023-04-26 PROCEDURE — 99214 OFFICE O/P EST MOD 30 MIN: CPT

## 2023-04-26 PROCEDURE — 73030 X-RAY EXAM OF SHOULDER: CPT | Mod: LT

## 2023-04-26 RX ORDER — METHYLPREDNISOLONE 4 MG/1
4 TABLET ORAL
Qty: 1 | Refills: 0 | Status: ACTIVE | COMMUNITY
Start: 2023-04-26 | End: 1900-01-01

## 2023-04-26 NOTE — ASSESSMENT
[FreeTextEntry1] : We discussed the underlying pathology. \par We reviewed the MRI findings. \par Treatment options reviewed. \par MDP is prescribed.\par Aleve use discussed for after MDP is finished.  \par She is working [employer].\par PT is prescribed. \par Cautions discussed. \par Questions answered. \par \par Patient seen by Alfred Guillaume M.D.\par Entered by Emiliana Matias acting as scribe.

## 2023-04-26 NOTE — REASON FOR VISIT
[FreeTextEntry2] :  4/2/23:\par This is a 63 year old RHD female  with left shoulder pain since 4/2/23. 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 She saw Dr. Story on 4/17/23 and was given a SA injection which seems to have helped. An MRI was ordered. Dr. Guillaume did left shoulder surgery for work related injury on 9/25/2017 which included Glenohumeral Debridement, Lysis of Posterior Capsular Adhesions, Subacromial Decompression, Distal Clavicle Resection . She had recovered well until the work accident 4/2/23. Reaching is painful. Night symptoms can occur. There is no n/t. NSAID use as needed with temporary relief.

## 2023-04-26 NOTE — IMAGING
[Left] : left shoulder [FreeTextEntry1] : The GH joint is ok. Distal clavicle excision is noted.  [FreeTextEntry5] : There is a type I acromion.

## 2023-04-26 NOTE — PHYSICAL EXAM
[Left] : left shoulder [Mild] : mild [5 ___] : forward flexion 5[unfilled]/5 [5___] : external rotation 5[unfilled]/5 [Right] : right shoulder [] : healed incision [Sitting] : sitting [FreeTextEntry8] : This is slight.  [FreeTextEntry9] : IR to T12. [TWNoteComboBox6] : internal rotation L3 [TWNoteComboBox4] : passive forward flexion 165 degrees [de-identified] : external rotation 75 degrees

## 2023-04-26 NOTE — HISTORY OF PRESENT ILLNESS
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 [] : no [FreeTextEntry1] : Left shoulder [FreeTextEntry3] : 4/2/23 [FreeTextEntry9] : cortisone injection [de-identified] : extension [de-identified] : Dr Guillaume and Dr Story [de-identified] : 4-5 years ago [de-identified] : xrays and MRI

## 2023-04-26 NOTE — CONSULT LETTER
[Dear  ___] : Dear  [unfilled], [Consult Letter:] : I had the pleasure of evaluating your patient, [unfilled]. [Please see my note below.] : Please see my note below. [Consult Closing:] : Thank you very much for allowing me to participate in the care of this patient.  If you have any questions, please do not hesitate to contact me. [Sincerely,] : Sincerely, [FreeTextEntry3] : Alfred Mendez M.D.\par Shoulder Surgery

## 2023-04-26 NOTE — DATA REVIEWED
[FreeTextEntry1] : LEFT SHOULDER MRI 4/20/23:\par Post-op changes are noted. Some cuff tendinopathy noted. There is decreased capsular volume. The biceps seems ok. The muscle is decent.

## 2023-04-28 ENCOUNTER — APPOINTMENT (OUTPATIENT)
Dept: ORTHOPEDIC SURGERY | Facility: CLINIC | Age: 63
End: 2023-04-28
Payer: OTHER MISCELLANEOUS

## 2023-04-28 VITALS — BODY MASS INDEX: 22.58 KG/M2 | HEIGHT: 68 IN | WEIGHT: 149 LBS

## 2023-04-28 DIAGNOSIS — M18.12 UNILATERAL PRIMARY OSTEOARTHRITIS OF FIRST CARPOMETACARPAL JOINT, LEFT HAND: ICD-10-CM

## 2023-04-28 DIAGNOSIS — M18.11 UNILATERAL PRIMARY OSTEOARTHRITIS OF FIRST CARPOMETACARPAL JOINT, RIGHT HAND: ICD-10-CM

## 2023-04-28 DIAGNOSIS — M77.8 OTHER ENTHESOPATHIES, NOT ELSEWHERE CLASSIFIED: ICD-10-CM

## 2023-04-28 PROCEDURE — 99213 OFFICE O/P EST LOW 20 MIN: CPT

## 2023-04-28 NOTE — PHYSICAL EXAM
[de-identified] : R hand: \par +thumb CMC swelling \par +thumb CMC tenderness \par Decreased thumb ROM \par +Basal grind test\par Tender TFCC \par Decreased wrist ROM\par \par L hand: \par +thumb CMC swelling \par +thumb CMC tenderness \par Decreased thumb ROM \par +Basal grind test\par Tender TFCC\par Decreased ROM wrist

## 2023-04-28 NOTE — HISTORY OF PRESENT ILLNESS
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 Franco. 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

## 2023-05-03 ENCOUNTER — APPOINTMENT (OUTPATIENT)
Dept: PAIN MANAGEMENT | Facility: CLINIC | Age: 63
End: 2023-05-03

## 2023-05-03 ENCOUNTER — APPOINTMENT (OUTPATIENT)
Dept: PAIN MANAGEMENT | Facility: CLINIC | Age: 63
End: 2023-05-03
Payer: COMMERCIAL

## 2023-05-03 VITALS — BODY MASS INDEX: 22.58 KG/M2 | HEIGHT: 68 IN | WEIGHT: 149 LBS

## 2023-05-03 PROCEDURE — J3490M: CUSTOM

## 2023-05-03 PROCEDURE — 99214 OFFICE O/P EST MOD 30 MIN: CPT | Mod: 25

## 2023-05-03 PROCEDURE — 20552 NJX 1/MLT TRIGGER POINT 1/2: CPT

## 2023-05-03 NOTE — HISTORY OF PRESENT ILLNESS
[Neck] : neck [Lower back] : lower back [4] : 4 [2] : 2 [Dull/Aching] : dull/aching [Intermittent] : intermittent [Household chores] : household chores [Sleep] : sleep [Rest] : rest [Injection therapy] : injection therapy [] : no [FreeTextEntry1] : left side of the neck  [FreeTextEntry7] : right side up to the head  [FreeTextEntry9] : changing positions  [de-identified] : activity, computer work

## 2023-05-03 NOTE — PHYSICAL EXAM
[] : no palpable masses [de-identified] : left lateral rotation 60 degrees [[id number]] : right lateral rotation 60 degrees

## 2023-06-07 ENCOUNTER — APPOINTMENT (OUTPATIENT)
Dept: ORTHOPEDIC SURGERY | Facility: CLINIC | Age: 63
End: 2023-06-07

## 2023-06-09 ENCOUNTER — APPOINTMENT (OUTPATIENT)
Dept: ORTHOPEDIC SURGERY | Facility: CLINIC | Age: 63
End: 2023-06-09

## 2023-06-14 ENCOUNTER — APPOINTMENT (OUTPATIENT)
Dept: PAIN MANAGEMENT | Facility: CLINIC | Age: 63
End: 2023-06-14
Payer: COMMERCIAL

## 2023-06-14 VITALS — BODY MASS INDEX: 22.58 KG/M2 | HEIGHT: 68 IN | WEIGHT: 149 LBS

## 2023-06-14 PROCEDURE — 99214 OFFICE O/P EST MOD 30 MIN: CPT | Mod: 25

## 2023-06-14 PROCEDURE — 20552 NJX 1/MLT TRIGGER POINT 1/2: CPT

## 2023-06-14 PROCEDURE — J3490M: CUSTOM

## 2023-06-14 NOTE — PHYSICAL EXAM
[] : no palpable masses [de-identified] : left lateral rotation 60 degrees [[id number]] : right lateral rotation 60 degrees

## 2023-06-14 NOTE — HISTORY OF PRESENT ILLNESS
[Neck] : neck [Lower back] : lower back [Dull/Aching] : dull/aching [Intermittent] : intermittent [Sleep] : sleep [Rest] : rest [Injection therapy] : injection therapy [7] : 7 [6] : 6 [] : no [FreeTextEntry1] : left side of the neck  right side hurts the most getting headaches  [FreeTextEntry7] : right side up to the head  [FreeTextEntry9] : changing positions  [de-identified] : activity, computer work

## 2023-07-26 ENCOUNTER — APPOINTMENT (OUTPATIENT)
Dept: PAIN MANAGEMENT | Facility: CLINIC | Age: 63
End: 2023-07-26
Payer: COMMERCIAL

## 2023-07-26 VITALS — BODY MASS INDEX: 22.88 KG/M2 | HEIGHT: 68 IN | WEIGHT: 151 LBS

## 2023-07-26 PROCEDURE — 99214 OFFICE O/P EST MOD 30 MIN: CPT | Mod: 25

## 2023-07-26 PROCEDURE — 20553 NJX 1/MLT TRIGGER POINTS 3/>: CPT

## 2023-07-26 PROCEDURE — J3490M: CUSTOM

## 2023-07-26 NOTE — HISTORY OF PRESENT ILLNESS
[Neck] : neck [Lower back] : lower back [7] : 7 [6] : 6 [Dull/Aching] : dull/aching [Intermittent] : intermittent [Sleep] : sleep [Rest] : rest [Injection therapy] : injection therapy [] : no [FreeTextEntry1] : left side of the neck  right side hurts the most getting headaches  [FreeTextEntry7] : right side up to the head  [FreeTextEntry9] : changing positions  [de-identified] : activity, computer work

## 2023-07-26 NOTE — ASSESSMENT
[FreeTextEntry1] : After discussing various treatment options with the patient including but not limited to oral medications, physical therapy, exercise, modalities as well as interventional spinal injections, we have decided with the following plan:\par \par 1) The risks, benefits, contents and alternatives to injection were explained in full to the patient.  Risks outlined include but are not limited to infection, sepsis, bleeding, scarring, skin discoloration, temporary increase in pain, syncopal episode, failure to resolve symptoms, allergic reaction, flare reaction, permanent white skin discoloration, symptom recurrence, and elevation of blood sugar in diabetics.  Patient understood the risks.  All questions were answered.  After discussion of options, patient requested an injection.  Oral informed consent was obtained and sterile prep was done of the injection site.  Sterile technique was used to introduce the mixture. The mixture consisted of 3 cc 1% lidocaine, 3cc 0.25% marcaine, and 10mg of kenalog.  Patient tolerated the procedure well.  Patient advised to ice the injection site this evening.  Signs and symptoms of infection reviewed and patient advised to call immediately for redness, fevers, and/or chills.\par \par 2) A MRI is indicated as there has been failure of numerous conservative therapies over the last 6-8 weeks. these include but are not limited to medication therapy and physical therapy. It is recognized that repeat imaging studies and other tests may be warranted by the clinical course and/or to follow the progress of treatment in some cases. It may be of value to repeat diagnostic procedures (ie imaging studies) during the course of care to reassess or stage the pathology when there is progression of symptoms or findings, prior to surgical interventions and/or therapeutic injections when clinically indicated.\par \par MRI Cervical spine\par \par

## 2023-07-26 NOTE — PHYSICAL EXAM
[] : no palpable masses [de-identified] : left lateral rotation 60 degrees [[id number]] : right lateral rotation 60 degrees

## 2023-08-07 ENCOUNTER — APPOINTMENT (OUTPATIENT)
Dept: MRI IMAGING | Facility: CLINIC | Age: 63
End: 2023-08-07
Payer: COMMERCIAL

## 2023-08-07 PROCEDURE — 72141 MRI NECK SPINE W/O DYE: CPT

## 2023-08-11 ENCOUNTER — APPOINTMENT (OUTPATIENT)
Dept: PAIN MANAGEMENT | Facility: CLINIC | Age: 63
End: 2023-08-11

## 2023-08-15 ENCOUNTER — APPOINTMENT (OUTPATIENT)
Dept: PAIN MANAGEMENT | Facility: CLINIC | Age: 63
End: 2023-08-15
Payer: COMMERCIAL

## 2023-08-15 VITALS — BODY MASS INDEX: 21.67 KG/M2 | HEIGHT: 68 IN | WEIGHT: 143 LBS

## 2023-08-15 PROCEDURE — 99214 OFFICE O/P EST MOD 30 MIN: CPT | Mod: 25

## 2023-08-15 PROCEDURE — 64405 NJX AA&/STRD GR OCPL NRV: CPT

## 2023-08-15 NOTE — HISTORY OF PRESENT ILLNESS
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

## 2023-08-15 NOTE — PHYSICAL EXAM
[] : no palpable masses [de-identified] : left lateral rotation 60 degrees [[id number]] : right lateral rotation 60 degrees

## 2023-08-15 NOTE — PROCEDURE
[Other: ____] : [unfilled] [Right] : of the right [___ cc    6mg] :  Betamethasone (Celestone) ~Vcc of 6mg [___ cc    0.25%] : Bupivacaine (Marcaine) ~Vcc of 0.25%  [___ cc    40mg] : Triamcinolone (Kenalog) ~Vcc of 40 mg

## 2023-08-15 NOTE — ASSESSMENT
[FreeTextEntry1] : After discussing various treatment options with the patient including but not limited to oral medications, physical therapy, exercise, modalities as well as interventional spinal injections, we have decided with the following plan:  AMANDA block today 1) The risks, benefits, contents and alternatives to injection were explained in full to the patient. Risks outlined include but are not limited to infection, sepsis, bleeding, scarring, skin discoloration, temporary increase in pain, syncopal episode, failure to resolve symptoms, allergic reaction, flare reaction, permanent white skin discoloration, symptom recurrence, and elevation of blood sugar in diabetics. Patient understood the risks. All questions were answered. After discussion of options, patient requested an injection. Oral informed consent was obtained and sterile prep was done of the injection site. Sterile technique was used to introduce the mixture. The mixture consisted of 1cc Celesone and 1cc Marcaine. The patient was placed in a sitting position with the cervical spine flexed. The occipital artery is palpated at the level of the superior nuchal ridge. A 25 gauge 1.5 inch needle is placed medial to artery until periosteum was contacted. After gentle aspiration, 2cc of the mixture was injected in a fanlike distribution bilaterally.

## 2023-08-30 ENCOUNTER — APPOINTMENT (OUTPATIENT)
Dept: ORTHOPEDIC SURGERY | Facility: CLINIC | Age: 63
End: 2023-08-30

## 2023-09-19 ENCOUNTER — NON-APPOINTMENT (OUTPATIENT)
Age: 63
End: 2023-09-19

## 2023-09-20 ENCOUNTER — APPOINTMENT (OUTPATIENT)
Dept: DERMATOLOGY | Facility: CLINIC | Age: 63
End: 2023-09-20
Payer: COMMERCIAL

## 2023-09-20 DIAGNOSIS — L72.0 EPIDERMAL CYST: ICD-10-CM

## 2023-09-20 PROCEDURE — 99213 OFFICE O/P EST LOW 20 MIN: CPT | Mod: 25

## 2023-09-20 PROCEDURE — 11102 TANGNTL BX SKIN SINGLE LES: CPT

## 2023-10-05 LAB — CORE LAB BIOPSY: NORMAL

## 2023-11-03 ENCOUNTER — NON-APPOINTMENT (OUTPATIENT)
Age: 63
End: 2023-11-03

## 2023-11-03 ENCOUNTER — APPOINTMENT (OUTPATIENT)
Dept: ORTHOPEDIC SURGERY | Facility: CLINIC | Age: 63
End: 2023-11-03
Payer: OTHER MISCELLANEOUS

## 2023-11-03 VITALS — BODY MASS INDEX: 21.67 KG/M2 | HEIGHT: 68 IN | WEIGHT: 143 LBS

## 2023-11-03 DIAGNOSIS — G56.01 CARPAL TUNNEL SYNDROME, RIGHT UPPER LIMB: ICD-10-CM

## 2023-11-03 DIAGNOSIS — G56.02 CARPAL TUNNEL SYNDROME, LEFT UPPER LIMB: ICD-10-CM

## 2023-11-03 DIAGNOSIS — M25.649 STIFFNESS OF UNSPECIFIED HAND, NOT ELSEWHERE CLASSIFIED: ICD-10-CM

## 2023-11-03 DIAGNOSIS — M65.341 TRIGGER FINGER, RIGHT RING FINGER: ICD-10-CM

## 2023-11-03 PROCEDURE — 99455 WORK RELATED DISABILITY EXAM: CPT

## 2023-11-04 ENCOUNTER — APPOINTMENT (OUTPATIENT)
Dept: DERMATOLOGY | Facility: CLINIC | Age: 63
End: 2023-11-04
Payer: COMMERCIAL

## 2023-11-04 DIAGNOSIS — Z91.038 OTHER INSECT ALLERGY STATUS: ICD-10-CM

## 2023-11-04 DIAGNOSIS — R21 RASH AND OTHER NONSPECIFIC SKIN ERUPTION: ICD-10-CM

## 2023-11-04 PROCEDURE — 11104 PUNCH BX SKIN SINGLE LESION: CPT

## 2023-11-04 PROCEDURE — 99213 OFFICE O/P EST LOW 20 MIN: CPT | Mod: 25

## 2023-11-04 RX ORDER — CLOBETASOL PROPIONATE 0.5 MG/G
0.05 CREAM TOPICAL
Qty: 1 | Refills: 0 | Status: ACTIVE | COMMUNITY
Start: 2023-09-20 | End: 1900-01-01

## 2023-11-10 LAB — CORE LAB BIOPSY: NORMAL

## 2023-11-14 ENCOUNTER — APPOINTMENT (OUTPATIENT)
Dept: PAIN MANAGEMENT | Facility: CLINIC | Age: 63
End: 2023-11-14

## 2023-11-15 ENCOUNTER — APPOINTMENT (OUTPATIENT)
Dept: DERMATOLOGY | Facility: CLINIC | Age: 63
End: 2023-11-15
Payer: COMMERCIAL

## 2023-11-15 ENCOUNTER — APPOINTMENT (OUTPATIENT)
Dept: PAIN MANAGEMENT | Facility: CLINIC | Age: 63
End: 2023-11-15
Payer: COMMERCIAL

## 2023-11-15 VITALS — HEIGHT: 68 IN | WEIGHT: 145 LBS | BODY MASS INDEX: 21.98 KG/M2

## 2023-11-15 DIAGNOSIS — M54.81 OCCIPITAL NEURALGIA: ICD-10-CM

## 2023-11-15 PROCEDURE — 11402 EXC TR-EXT B9+MARG 1.1-2 CM: CPT

## 2023-11-15 PROCEDURE — 12032 INTMD RPR S/A/T/EXT 2.6-7.5: CPT

## 2023-11-15 PROCEDURE — 64450 NJX AA&/STRD OTHER PN/BRANCH: CPT

## 2023-11-15 PROCEDURE — 99214 OFFICE O/P EST MOD 30 MIN: CPT | Mod: 25

## 2023-11-15 PROCEDURE — J3490M: CUSTOM

## 2023-11-15 RX ORDER — GABAPENTIN 300 MG/1
300 CAPSULE ORAL
Qty: 90 | Refills: 2 | Status: ACTIVE | COMMUNITY
Start: 2023-11-15 | End: 1900-01-01

## 2023-11-17 ENCOUNTER — APPOINTMENT (OUTPATIENT)
Dept: DERMATOLOGY | Facility: CLINIC | Age: 63
End: 2023-11-17

## 2023-11-22 LAB — CORE LAB BIOPSY: NORMAL

## 2023-11-29 ENCOUNTER — APPOINTMENT (OUTPATIENT)
Dept: DERMATOLOGY | Facility: CLINIC | Age: 63
End: 2023-11-29
Payer: COMMERCIAL

## 2023-11-29 DIAGNOSIS — D48.5 NEOPLASM OF UNCERTAIN BEHAVIOR OF SKIN: ICD-10-CM

## 2023-11-29 PROCEDURE — 12032 INTMD RPR S/A/T/EXT 2.6-7.5: CPT

## 2023-11-29 PROCEDURE — 11402 EXC TR-EXT B9+MARG 1.1-2 CM: CPT

## 2023-12-08 LAB — CORE LAB BIOPSY: NORMAL

## 2023-12-08 NOTE — H&P PST ADULT - ADMIT DATE
13-Jul-2017 Subjective   Arvind Zamora is a 1 y.o. male who is brought in by his mother and father for this well child visit.  Immunization History   Administered Date(s) Administered    DTaP HepB IPV combined vaccine, pedatric (PEDIARIX) 03/10/2022, 05/17/2022, 10/03/2022    DTaP vaccine, pediatric  (INFANRIX) 06/05/2023    Hepatitis A vaccine, pediatric/adolescent (HAVRIX, VAQTA) 12/19/2022, 12/08/2023    Hepatitis B vaccine, pediatric/adolescent (RECOMBIVAX, ENGERIX) 2021    HiB PRP-T conjugate vaccine (HIBERIX, ACTHIB) 03/10/2022, 05/17/2022, 10/03/2022, 06/05/2023    MMR vaccine, subcutaneous (MMR II) 12/19/2022    Pneumococcal conjugate vaccine, 13-valent (PREVNAR 13) 03/10/2022, 05/17/2022, 10/03/2022, 12/19/2022    Varicella vaccine, subcutaneous (VARIVAX) 06/05/2023     History of previous adverse reactions to immunizations? no  The following portions of the patient's history were reviewed by a provider in this encounter and updated as appropriate:       Well Child Assessment:  History was provided by the mother and father. Arvind lives with his mother, father and brother.   Dental  Patient has a dental home: first appointment scheduled in coming weeks.   Elimination  Elimination problems do not include constipation or diarrhea.   Behavioral  Behavioral issues do not include biting or hitting.   Sleep  The patient sleeps in his crib. There are no sleep problems.   Screening  Immunizations are up-to-date.   Social  The caregiver enjoys the child.       Objective   Growth parameters are noted and are appropriate for age.  Appears to respond to sounds? yes  Vision screening done? no  Physical Exam    Gen: Well-nourished, well-hydrated, in no acute distress.  Skin: Warm and pink with dry erythematous patches to chest, back. Dry skin to arms, legs   Head: Normocephalic, atraumatic, anterior fontanelle open, soft, and flat.  Eyes: Bilateral red reflex present. PERRL.  Ears: Normal tympanic membranes and ear canals  bilaterally.  Nose: No congestion or rhinorrhea.  Mouth/Throat: Mouth without oral lesions, exudates, or thrush. Moist mucous membranes.  Neck: Supple without lymphadenopathy or masses.  Cardiovascular: Heart with regular rate and rhythm. No significant murmur. Bilateral femoral pulses 2+.  Lungs: Clear to auscultation bilaterally. No wheezes, rales, or rhonchi. No increased work of breathing. Good air exchange.  Abdomen: Soft, nontender, nondistended, without hepatosplenomegaly, no palpable mass.  Genitalia: Tushar 1 male with normal external genitalia: circumcised penis, testes descended bilaterally, no hydrocele.  Back/Spine: Normal to visual inspection.  Extremities: Moves all extremities equal and well. Hills-Ortolani maneuver negative.  Neurologic: Normal tone. Normal reflexes. No focal deficits. 2+ DTRs.     Assessment/Plan   Healthy exam.    1. Anticipatory guidance:   2.  Weight management:  The patient was counseled regarding nutrition and physical activity.  3.   Orders Placed This Encounter   Procedures    Hepatitis A vaccine, pediatric/adolescent (HAVRIX, VAQTA)     4. Follow-up visit in 6 months for next well child visit, or sooner as needed.

## 2023-12-11 ENCOUNTER — APPOINTMENT (OUTPATIENT)
Dept: DERMATOLOGY | Facility: CLINIC | Age: 63
End: 2023-12-11
Payer: COMMERCIAL

## 2023-12-11 PROCEDURE — 99024 POSTOP FOLLOW-UP VISIT: CPT

## 2024-02-26 ENCOUNTER — EMERGENCY (EMERGENCY)
Facility: HOSPITAL | Age: 64
LOS: 0 days | Discharge: HOME | End: 2024-02-26
Attending: EMERGENCY MEDICINE
Payer: COMMERCIAL

## 2024-02-26 VITALS
SYSTOLIC BLOOD PRESSURE: 130 MMHG | DIASTOLIC BLOOD PRESSURE: 83 MMHG | RESPIRATION RATE: 18 BRPM | TEMPERATURE: 98 F | OXYGEN SATURATION: 96 % | HEART RATE: 63 BPM

## 2024-02-26 VITALS
HEIGHT: 65 IN | SYSTOLIC BLOOD PRESSURE: 140 MMHG | RESPIRATION RATE: 18 BRPM | HEART RATE: 70 BPM | TEMPERATURE: 98 F | DIASTOLIC BLOOD PRESSURE: 88 MMHG | OXYGEN SATURATION: 100 % | WEIGHT: 149.91 LBS

## 2024-02-26 DIAGNOSIS — Z98.890 OTHER SPECIFIED POSTPROCEDURAL STATES: Chronic | ICD-10-CM
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
## 2024-02-26 DIAGNOSIS — Z98.51 TUBAL LIGATION STATUS: Chronic | ICD-10-CM

## 2024-02-26 DIAGNOSIS — M54.2 CERVICALGIA: ICD-10-CM

## 2024-02-26 DIAGNOSIS — M79.642 PAIN IN LEFT HAND: ICD-10-CM

## 2024-02-26 DIAGNOSIS — S13.9XXA SPRAIN OF JOINTS AND LIGAMENTS OF UNSPECIFIED PARTS OF NECK, INITIAL ENCOUNTER: ICD-10-CM

## 2024-02-26 DIAGNOSIS — R51.9 HEADACHE, UNSPECIFIED: ICD-10-CM
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
## 2024-02-26 DIAGNOSIS — Z98.89 OTHER SPECIFIED POSTPROCEDURAL STATES: Chronic | ICD-10-CM
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
## 2024-02-26 PROCEDURE — 99285 EMERGENCY DEPT VISIT HI MDM: CPT

## 2024-02-26 PROCEDURE — 72125 CT NECK SPINE W/O DYE: CPT | Mod: 26,MC

## 2024-02-26 PROCEDURE — 99284 EMERGENCY DEPT VISIT MOD MDM: CPT | Mod: 25

## 2024-02-26 PROCEDURE — 73130 X-RAY EXAM OF HAND: CPT | Mod: LT

## 2024-02-26 PROCEDURE — 70450 CT HEAD/BRAIN W/O DYE: CPT | Mod: MC

## 2024-02-26 PROCEDURE — 73130 X-RAY EXAM OF HAND: CPT | Mod: 26,LT

## 2024-02-26 PROCEDURE — 72125 CT NECK SPINE W/O DYE: CPT | Mod: MC

## 2024-02-26 PROCEDURE — 70450 CT HEAD/BRAIN W/O DYE: CPT | Mod: 26,MC
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
## 2024-02-26 NOTE — ED PROVIDER NOTE - CLINICAL SUMMARY MEDICAL DECISION MAKING FREE TEXT BOX
CT of the head, C-spine were unremarkable.  X-ray of the left hand was performed independently interpreted by myself reveal no acute fracture or dislocation.  Patient declines pain medication department.  No other signs of trauma body.  Okay for discharge at this time, supportive care, strict return precautions given for any worsening.  Patient verbalized understanding agrees plan of time.
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
## 2024-02-26 NOTE — ED PROVIDER NOTE - PATIENT PORTAL LINK FT
You can access the FollowMyHealth Patient Portal offered by [facility] by registering at the following website: [email or web address]. By joining [employer]’s FollowMyHealth portal, you will also be able to view your health information using other applications (apps) compatible with our system.
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
## 2024-03-19 ENCOUNTER — APPOINTMENT (OUTPATIENT)
Dept: PAIN MANAGEMENT | Facility: CLINIC | Age: 64
End: 2024-03-19
Payer: COMMERCIAL

## 2024-03-19 VITALS — BODY MASS INDEX: 21.22 KG/M2 | HEIGHT: 68 IN | WEIGHT: 140 LBS

## 2024-03-19 DIAGNOSIS — M54.2 CERVICALGIA: ICD-10-CM

## 2024-03-19 PROCEDURE — 20552 NJX 1/MLT TRIGGER POINT 1/2: CPT

## 2024-03-19 PROCEDURE — J3490M: CUSTOM

## 2024-03-19 NOTE — PHYSICAL EXAM
[] : no erythema [de-identified] : left lateral rotation 60 degrees [TWNoteComboBox6] : right lateral rotation 60 degrees

## 2024-03-19 NOTE — HISTORY OF PRESENT ILLNESS
[Neck] : neck [Dull/Aching] : dull/aching [Intermittent] : intermittent [Sleep] : sleep [Rest] : rest [Injection therapy] : injection therapy [5] : 5 [8] : 8 [Radiating] : radiating [] : no [FreeTextEntry1] : left side of the neck  right side hurts the most getting headaches  [FreeTextEntry9] : changing positions  [FreeTextEntry7] : right side up to the head  [de-identified] : activity, computer work  [de-identified] : c mri

## 2024-03-19 NOTE — ASSESSMENT
[FreeTextEntry1] : After discussing various treatment options with the patient including but not limited to oral medications, physical therapy, exercise, modalities as well as interventional spinal injections, we have decided with the following plan:  1. I would recommend a trial of neuropathic medication as patient presents with signs of nerve irritation. (ie burning, paresthesias etc) Goals of therapy would be to improve pain and overall QOL. Side effects reviewed with patient. Patient will call or stop medication if given side effects occur.   gabapentin   2.  The risks, benefits, contents and alternatives to injection were explained in full to the patient. Risks outlined include but are not limited to infection, sepsis, bleeding, scarring, skin discoloration, temporary increase in pain, syncopal episode, failure to resolve symptoms, allergic reaction, flare reaction, permanent white skin discoloration, symptom recurrence, and elevation of blood sugar in diabetics. Patient understood the risks. All questions were answered. After discussion of options, patient requested an injection. Oral informed consent was obtained and sterile prep was done of the injection site. Sterile technique was used to introduce the mixture. The mixture consisted of 1 cc 0.25% marcaine and 6mg Celestone was injected. The patient was placed in a sitting position with the cervical spine flexed. The occipital artery is palpated at the level of the superior nuchal ridge. A 25 gauge 1.5 inch needle is placed medial to artery until periosteum was contacted. After gentle aspiration, 2cc of the mixture was injected in a fanlike distribution bilaterally.

## 2024-03-19 NOTE — PROCEDURE
[Right] : of the right [Other: ____] : [unfilled] [___ cc    6mg] :  Betamethasone (Celestone) ~Vcc of 6mg [___ cc    40mg] : Triamcinolone (Kenalog) ~Vcc of 40 mg  [___ cc    0.25%] : Bupivacaine (Marcaine) ~Vcc of 0.25%

## 2024-06-18 ENCOUNTER — APPOINTMENT (OUTPATIENT)
Dept: PAIN MANAGEMENT | Facility: CLINIC | Age: 64
End: 2024-06-18

## 2024-08-01 ENCOUNTER — APPOINTMENT (OUTPATIENT)
Dept: ORTHOPEDIC SURGERY | Facility: CLINIC | Age: 64
End: 2024-08-01

## 2024-08-01 VITALS — BODY MASS INDEX: 27.68 KG/M2 | WEIGHT: 141 LBS | HEIGHT: 60 IN

## 2024-08-01 DIAGNOSIS — Z92.29 PERSONAL HISTORY OF OTHER DRUG THERAPY: ICD-10-CM

## 2024-08-01 DIAGNOSIS — Z86.2 PERSONAL HISTORY OF DISEASES OF THE BLOOD AND BLOOD-FORMING ORGANS AND CERTAIN DISORDERS INVOLVING THE IMMUNE MECHANISM: ICD-10-CM

## 2024-08-01 DIAGNOSIS — M16.11 UNILATERAL PRIMARY OSTEOARTHRITIS, RIGHT HIP: ICD-10-CM

## 2024-08-01 DIAGNOSIS — M43.17 SPONDYLOLISTHESIS, LUMBOSACRAL REGION: ICD-10-CM

## 2024-08-01 DIAGNOSIS — M43.10 SPONDYLOLISTHESIS, SITE UNSPECIFIED: ICD-10-CM

## 2024-08-01 PROCEDURE — 72100 X-RAY EXAM L-S SPINE 2/3 VWS: CPT

## 2024-08-01 PROCEDURE — 99204 OFFICE O/P NEW MOD 45 MIN: CPT

## 2024-08-01 PROCEDURE — 72170 X-RAY EXAM OF PELVIS: CPT

## 2024-08-01 RX ORDER — METHYLPREDNISOLONE 4 MG/1
4 TABLET ORAL
Qty: 21 | Refills: 1 | Status: ACTIVE | COMMUNITY
Start: 2024-08-01 | End: 1900-01-01

## 2024-08-01 RX ORDER — HYDROXYCHLOROQUINE SULFATE 100 MG/1
100 TABLET ORAL
Refills: 0 | Status: ACTIVE | COMMUNITY

## 2024-08-01 RX ORDER — IBUPROFEN 800 MG/1
800 TABLET, FILM COATED ORAL
Qty: 90 | Refills: 3 | Status: ACTIVE | COMMUNITY
Start: 2024-08-01 | End: 1900-01-01

## 2024-08-01 RX ORDER — NORETHINDRONE ACETATE AND ETHINYL ESTRADIOL 1; 5 MG/1; UG/1
TABLET ORAL
Refills: 0 | Status: ACTIVE | COMMUNITY

## 2024-08-01 NOTE — HISTORY OF PRESENT ILLNESS
[9] : 9 [7] : 7 [Dull/Aching] : dull/aching [Tightness] : tightness [Intermittent] : intermittent [Rest] : rest [Meds] : meds [Sitting] : sitting [Walking] : walking [Lower back] : lower back [3] : 3 [Sharp] : sharp [Shooting] : shooting [Constant] : constant [Heat] : heat [Retired] : Work status: retired [de-identified] : 08/01/24:  Return visit for a 64 year old female ,hx of OA rt hip, who got up to walk x 4 days ago and noticed acute flare up of rt groin pian. Had difficulty stairclimbing and walking. Had to use a cane. Took motrin 800mg prn which helped.  04/26/22:  Returns today for right hip MRI results. Still c/o rt hip pain when walking. Taking motrin 800mg 1-2x/day w/ some relief.  03/10/22: Returns today for recurring right groin pain x last 2 days duration. NO hx of trauma. Could hardly walk. Limping. Took motrin 800 mg which helped. Pain was a "9" and now"1-2".  08/17/21: Returns today for cervical spine MRI results. States her pain level is severe with right neck pain with so radiating into her right shoulder blade. Took the MDP only one time, which did not help.  7/20/21 Return visit for this 60 yo female c/o progressing right sided neck pain x last 3-4 months duration. No hx o trauma. Constant and daily. Worse turning head and neck. No wake up pain at night. Pain is a 7-8. Takes motrin 60 800mg prn with some relief.   Also complaining of dull right groin pain for the last four months since she had gone on a trip. Yesterday, when she stood up her leg buckled. PMH: No previous right groin pain. [] : no [FreeTextEntry1] : bilateral hips [FreeTextEntry5] : back/bilateral hips [FreeTextEntry7] : bilateral legs [de-identified] : 3/10/22 [de-identified] : dr ramon [de-identified] : none

## 2024-08-01 NOTE — PHYSICAL EXAM
[Normal Mood and Affect] : normal mood and affect [Oriented] : oriented [Able to Communicate] : able to communicate [Well Developed] : well developed [Well Nourished] : well nourished [Right] : right hip [NL (35)] : adduction 35 degrees [NL (45)] : internal rotation 45 degrees [5___] : adduction 5[unfilled]/5 [Spondylolithesis] : Spondylolithesis [AP] : anteroposterior [Moderate arthritis (Tonnis Grade 2)] : Moderate arthritis (Tonnis Grade 2) [FreeTextEntry1] : Grade 1-2 spondylo at L5-S1 with pars defect L5 [] : no groin pain with resisted straight leg raise [de-identified] : Unchanged from xrays taken 2022 [TWNoteComboBox7] : flexion 110 degrees

## 2024-08-01 NOTE — PLAN
[TextEntry] : The natural progression of osteoarthritis was explained to the patient.  We discussed the possible treatment options from conservative to operative.  These included NSAIDs, Glucosamine and Chondroitin sulfate and physical therapy.  We also discussed that at some point they may progress to need a CHARLES.  Information and pamphlets were given.  Switch from motrin to MDP

## 2024-08-01 NOTE — HISTORY OF PRESENT ILLNESS
[9] : 9 [7] : 7 [Dull/Aching] : dull/aching [Tightness] : tightness [Intermittent] : intermittent [Rest] : rest [Meds] : meds [Sitting] : sitting [Walking] : walking [Lower back] : lower back [3] : 3 [Sharp] : sharp [Shooting] : shooting [Constant] : constant [Heat] : heat [Retired] : Work status: retired [de-identified] : 08/01/24:  Return visit for a 64 year old female ,hx of OA rt hip, who got up to walk x 4 days ago and noticed acute flare up of rt groin pian. Had difficulty stairclimbing and walking. Had to use a cane. Took motrin 800mg prn which helped.  04/26/22:  Returns today for right hip MRI results. Still c/o rt hip pain when walking. Taking motrin 800mg 1-2x/day w/ some relief.  03/10/22: Returns today for recurring right groin pain x last 2 days duration. NO hx of trauma. Could hardly walk. Limping. Took motrin 800 mg which helped. Pain was a "9" and now"1-2".  08/17/21: Returns today for cervical spine MRI results. States her pain level is severe with right neck pain with so radiating into her right shoulder blade. Took the MDP only one time, which did not help.  7/20/21 Return visit for this 60 yo female c/o progressing right sided neck pain x last 3-4 months duration. No hx o trauma. Constant and daily. Worse turning head and neck. No wake up pain at night. Pain is a 7-8. Takes motrin 60 800mg prn with some relief.   Also complaining of dull right groin pain for the last four months since she had gone on a trip. Yesterday, when she stood up her leg buckled. PMH: No previous right groin pain. [] : no [FreeTextEntry1] : bilateral hips [FreeTextEntry5] : back/bilateral hips [FreeTextEntry7] : bilateral legs [de-identified] : 3/10/22 [de-identified] : dr ramon [de-identified] : none

## 2024-08-01 NOTE — PHYSICAL EXAM
[Normal Mood and Affect] : normal mood and affect [Oriented] : oriented [Able to Communicate] : able to communicate [Well Developed] : well developed [Well Nourished] : well nourished [Right] : right hip [NL (35)] : adduction 35 degrees [NL (45)] : internal rotation 45 degrees [5___] : adduction 5[unfilled]/5 [Spondylolithesis] : Spondylolithesis [AP] : anteroposterior [Moderate arthritis (Tonnis Grade 2)] : Moderate arthritis (Tonnis Grade 2) [FreeTextEntry1] : Grade 1-2 spondylo at L5-S1 with pars defect L5 [] : no groin pain with resisted straight leg raise [de-identified] : Unchanged from xrays taken 2022 [TWNoteComboBox7] : flexion 110 degrees

## 2025-05-10 NOTE — ASU DISCHARGE PLAN (ADULT/PEDIATRIC). - YOU WERE IN THE HOSPITAL FOR:
PACU ANESTHESIA ADMISSION NOTE      Procedure: Hemiarthroplasty of left hip      Post op diagnosis:  Fracture of femoral neck, left        ____  Intubated  TV:______       Rate: ______      FiO2: ______    __x__  Patent Airway    __x__  Full return of protective reflexes    __x__  Full recovery from anesthesia / back to baseline     Vitals:   T:    37       R: 15                 BP:   105/77               Sat:  99                 P: 92      Mental Status:  __x__ Awake   ___x__ Alert   _____ Drowsy   _____ Sedated    Nausea/Vomiting:  _x___ NO  ______Yes,   See Post - Op Orders          Pain Scale (0-10):  _____    Treatment: __x__ None    ____ See Post - Op/PCA Orders    Post - Operative Fluids:   ____ Oral   ___x_ See Post - Op Orders    Plan: Discharge:   __x__Home       _____Floor     _____Critical Care    _____  Other:_________________    Comments:    Uneventful anesthesia. Patient transported to  spontaneously breathing and hemodynamically stable. PACU ANESTHESIA ADMISSION NOTE      Procedure: Hemiarthroplasty of left hip      Post op diagnosis:  Fracture of femoral neck, left        ____  Intubated  TV:______       Rate: ______      FiO2: ______    __x__  Patent Airway    __x__  Full return of protective reflexes    __x__  Full recovery from anesthesia / back to baseline     Vitals:   T:    37       R: 15                 BP:   105/77               Sat:  99                 P: 92      Mental Status:  __x__ Awake   ___x__ Alert   _____ Drowsy   _____ Sedated    Nausea/Vomiting:  _x___ NO  ______Yes,   See Post - Op Orders          Pain Scale (0-10):  _____    Treatment: __x__ None    ____ See Post - Op/PCA Orders    Post - Operative Fluids:   ____ Oral   ___x_ See Post - Op Orders    Plan: Discharge:   ____Home       _____Floor     __x___Critical Care    _____  Other:_________________    Comments:    Uneventful anesthesia. Patient transported to  spontaneously breathing and hemodynamically stable.. Left shoulder surgery

## 2025-05-21 NOTE — PROCEDURE
7/14 The patient is here for follow up of a colonoscopy procedure on 7/1/2014 related to screening. The procedure findings were significant for: one 5 mm polyp at the rectosigmoid colon. The polyp was removed with a cold biopsy forceps. The biopsy of the polyp demonstrates a hyperplastic polyp. Internal hemorrhoids.  The patient reports at today's visit that she is feeling generally well. She is moving her bowels regularly. She reports she had a low grade fever and chills for 2 hours after the colonoscopy procedure; no abdominal pain or rectal bleeding. She took Tylenol and the fever resolved. She denies any flu like symptoms. She denies any further gastrointestinal complaints. 2/23  Patient of Dr. Resendiz, today here in good general state, she was referred by her nephrologist for a colonoscopy.  Patient has end-stage renal disease not on dialysis.  She has a medical history of hyperplastic polyps, denies any personal family history of rectal cancer, rectal bleeding or change in her bowel habits. 5/23 She is in good general state she has no GI complaints, patient denies any abdominal pain, diarrhea, rectal bleeding or hematochezia. Shows evidence of a 5 mm hyperplastic polyp into the rectum, mild diverticular disease of the sigmoid colon, and internal hemorrhoids.  Tortuous colon, and altered vascular any hemorrhagic mucosa in the transverse colon and in the ascending colon, that could be related to barotrauma.  05/25 Patient here today in good general state.  She was referred to us by her Nephrologist  Patient has an occult blood in stool laboratory positive, this laboratory was done on April 2024. Patient deny any hematemesis, no nausea hematochezia, rectal bleeding, melena, blood medical history of end-stage renal disease on dialysis.  Patient last colonoscopy was done in 2023 (please read note above) Patient will have anemia workup. [Bilateral] : bilaterally of the [Cervical paraspinal muscle] : cervical paraspinal muscle